# Patient Record
Sex: MALE | Race: BLACK OR AFRICAN AMERICAN | NOT HISPANIC OR LATINO | Employment: UNEMPLOYED | URBAN - NONMETROPOLITAN AREA
[De-identification: names, ages, dates, MRNs, and addresses within clinical notes are randomized per-mention and may not be internally consistent; named-entity substitution may affect disease eponyms.]

---

## 2017-07-17 ENCOUNTER — OFFICE VISIT (OUTPATIENT)
Dept: FAMILY MEDICINE CLINIC | Facility: CLINIC | Age: 37
End: 2017-07-17

## 2017-07-17 ENCOUNTER — APPOINTMENT (OUTPATIENT)
Dept: LAB | Facility: HOSPITAL | Age: 37
End: 2017-07-17

## 2017-07-17 VITALS
DIASTOLIC BLOOD PRESSURE: 90 MMHG | BODY MASS INDEX: 17.93 KG/M2 | HEIGHT: 72 IN | OXYGEN SATURATION: 98 % | WEIGHT: 132.4 LBS | SYSTOLIC BLOOD PRESSURE: 160 MMHG | HEART RATE: 81 BPM

## 2017-07-17 DIAGNOSIS — F41.1 GENERALIZED ANXIETY DISORDER: ICD-10-CM

## 2017-07-17 DIAGNOSIS — R94.31 ABNORMAL EKG: ICD-10-CM

## 2017-07-17 DIAGNOSIS — E78.5 HYPERLIPIDEMIA, UNSPECIFIED HYPERLIPIDEMIA TYPE: ICD-10-CM

## 2017-07-17 DIAGNOSIS — I10 ESSENTIAL HYPERTENSION: Primary | ICD-10-CM

## 2017-07-17 DIAGNOSIS — N52.9 ERECTILE DYSFUNCTION, UNSPECIFIED ERECTILE DYSFUNCTION TYPE: ICD-10-CM

## 2017-07-17 DIAGNOSIS — Z51.81 MEDICATION MONITORING ENCOUNTER: ICD-10-CM

## 2017-07-17 LAB
ALBUMIN SERPL-MCNC: 5.1 G/DL (ref 3.4–4.8)
ALBUMIN/GLOB SERPL: 1.2 G/DL (ref 1.1–1.8)
ALP SERPL-CCNC: 102 U/L (ref 38–126)
ALT SERPL W P-5'-P-CCNC: 28 U/L (ref 21–72)
ANION GAP SERPL CALCULATED.3IONS-SCNC: 12 MMOL/L (ref 5–15)
ARTICHOKE IGE QN: 112 MG/DL (ref 1–129)
AST SERPL-CCNC: 23 U/L (ref 17–59)
BASOPHILS # BLD AUTO: 0.02 10*3/MM3 (ref 0–0.2)
BASOPHILS NFR BLD AUTO: 0.3 % (ref 0–2)
BILIRUB SERPL-MCNC: 0.7 MG/DL (ref 0.2–1.3)
BUN BLD-MCNC: 10 MG/DL (ref 7–21)
BUN/CREAT SERPL: 10.3 (ref 7–25)
CALCIUM SPEC-SCNC: 9.7 MG/DL (ref 8.4–10.2)
CHLORIDE SERPL-SCNC: 101 MMOL/L (ref 95–110)
CHOLEST SERPL-MCNC: 223 MG/DL (ref 0–199)
CO2 SERPL-SCNC: 27 MMOL/L (ref 22–31)
CREAT BLD-MCNC: 0.97 MG/DL (ref 0.7–1.3)
DEPRECATED RDW RBC AUTO: 45.7 FL (ref 35.1–43.9)
EOSINOPHIL # BLD AUTO: 0.18 10*3/MM3 (ref 0–0.7)
EOSINOPHIL NFR BLD AUTO: 2.4 % (ref 0–7)
ERYTHROCYTE [DISTWIDTH] IN BLOOD BY AUTOMATED COUNT: 13.8 % (ref 11.5–14.5)
GFR SERPL CREATININE-BSD FRML MDRD: 104 ML/MIN/1.73 (ref 70–162)
GFR SERPL CREATININE-BSD FRML MDRD: 86 ML/MIN/1.73 (ref 70–162)
GLOBULIN UR ELPH-MCNC: 4.1 GM/DL (ref 2.3–3.5)
GLUCOSE BLD-MCNC: 79 MG/DL (ref 60–100)
HBA1C MFR BLD: 5.46 % (ref 4–5.6)
HCT VFR BLD AUTO: 46.5 % (ref 39–49)
HDLC SERPL-MCNC: 66 MG/DL (ref 60–200)
HGB BLD-MCNC: 16 G/DL (ref 13.7–17.3)
IMM GRANULOCYTES # BLD: 0.08 10*3/MM3 (ref 0–0.02)
IMM GRANULOCYTES NFR BLD: 1.1 % (ref 0–0.5)
LDLC/HDLC SERPL: 2.08 {RATIO} (ref 0–3.55)
LYMPHOCYTES # BLD AUTO: 1.85 10*3/MM3 (ref 0.6–4.2)
LYMPHOCYTES NFR BLD AUTO: 24.4 % (ref 10–50)
MCH RBC QN AUTO: 31.3 PG (ref 26.5–34)
MCHC RBC AUTO-ENTMCNC: 34.4 G/DL (ref 31.5–36.3)
MCV RBC AUTO: 90.8 FL (ref 80–98)
MONOCYTES # BLD AUTO: 0.5 10*3/MM3 (ref 0–0.9)
MONOCYTES NFR BLD AUTO: 6.6 % (ref 0–12)
NEUTROPHILS # BLD AUTO: 4.96 10*3/MM3 (ref 2–8.6)
NEUTROPHILS NFR BLD AUTO: 65.2 % (ref 37–80)
PLATELET # BLD AUTO: 189 10*3/MM3 (ref 150–450)
PMV BLD AUTO: 10.2 FL (ref 8–12)
POTASSIUM BLD-SCNC: 3.9 MMOL/L (ref 3.5–5.1)
PROT SERPL-MCNC: 9.2 G/DL (ref 6.3–8.6)
RBC # BLD AUTO: 5.12 10*6/MM3 (ref 4.37–5.74)
SODIUM BLD-SCNC: 140 MMOL/L (ref 137–145)
T4 FREE SERPL-MCNC: 1.09 NG/DL (ref 0.78–2.19)
TRIGL SERPL-MCNC: 97 MG/DL (ref 20–199)
TSH SERPL DL<=0.05 MIU/L-ACNC: 1.03 MIU/ML (ref 0.46–4.68)
WBC NRBC COR # BLD: 7.59 10*3/MM3 (ref 3.2–9.8)

## 2017-07-17 PROCEDURE — 80061 LIPID PANEL: CPT | Performed by: FAMILY MEDICINE

## 2017-07-17 PROCEDURE — 83036 HEMOGLOBIN GLYCOSYLATED A1C: CPT | Performed by: FAMILY MEDICINE

## 2017-07-17 PROCEDURE — 84439 ASSAY OF FREE THYROXINE: CPT | Performed by: FAMILY MEDICINE

## 2017-07-17 PROCEDURE — 93005 ELECTROCARDIOGRAM TRACING: CPT | Performed by: FAMILY MEDICINE

## 2017-07-17 PROCEDURE — 85025 COMPLETE CBC W/AUTO DIFF WBC: CPT | Performed by: FAMILY MEDICINE

## 2017-07-17 PROCEDURE — 36415 COLL VENOUS BLD VENIPUNCTURE: CPT | Performed by: FAMILY MEDICINE

## 2017-07-17 PROCEDURE — 80053 COMPREHEN METABOLIC PANEL: CPT | Performed by: FAMILY MEDICINE

## 2017-07-17 PROCEDURE — 99204 OFFICE O/P NEW MOD 45 MIN: CPT | Performed by: FAMILY MEDICINE

## 2017-07-17 PROCEDURE — 84443 ASSAY THYROID STIM HORMONE: CPT | Performed by: FAMILY MEDICINE

## 2017-07-17 PROCEDURE — 93010 ELECTROCARDIOGRAM REPORT: CPT | Performed by: INTERNAL MEDICINE

## 2017-07-17 RX ORDER — TADALAFIL 10 MG/1
TABLET ORAL
Qty: 10 TABLET | Refills: 5 | Status: SHIPPED | OUTPATIENT
Start: 2017-07-17 | End: 2018-08-03

## 2017-07-17 RX ORDER — QUETIAPINE FUMARATE 400 MG/1
400 TABLET, FILM COATED ORAL DAILY
Qty: 30 TABLET | Refills: 0 | Status: SHIPPED | OUTPATIENT
Start: 2017-07-17 | End: 2018-12-27 | Stop reason: ALTCHOICE

## 2017-07-17 RX ORDER — AMLODIPINE BESYLATE 2.5 MG/1
2.5 TABLET ORAL DAILY
Qty: 30 TABLET | Refills: 11 | Status: SHIPPED | OUTPATIENT
Start: 2017-07-17 | End: 2018-08-03 | Stop reason: SDUPTHER

## 2017-07-17 RX ORDER — QUETIAPINE FUMARATE 400 MG/1
400 TABLET, FILM COATED ORAL DAILY
COMMUNITY
Start: 2017-06-16 | End: 2017-07-17 | Stop reason: SDUPTHER

## 2017-07-19 ENCOUNTER — TELEPHONE (OUTPATIENT)
Dept: FAMILY MEDICINE CLINIC | Facility: CLINIC | Age: 37
End: 2017-07-19

## 2017-07-19 NOTE — TELEPHONE ENCOUNTER
We can print the rx's again except for the cialis. We will need to send this in to whichever pharmacy he chooses.

## 2017-07-21 ENCOUNTER — TELEPHONE (OUTPATIENT)
Dept: FAMILY MEDICINE CLINIC | Facility: CLINIC | Age: 37
End: 2017-07-21

## 2017-12-29 ENCOUNTER — TRANSCRIBE ORDERS (OUTPATIENT)
Dept: PHYSICAL THERAPY | Facility: HOSPITAL | Age: 37
End: 2017-12-29

## 2017-12-29 DIAGNOSIS — S32.401B: Primary | ICD-10-CM

## 2018-01-02 ENCOUNTER — HOSPITAL ENCOUNTER (OUTPATIENT)
Dept: PHYSICAL THERAPY | Facility: HOSPITAL | Age: 38
Setting detail: THERAPIES SERIES
Discharge: HOME | End: 2018-01-02

## 2018-01-02 DIAGNOSIS — M25.551 PAIN OF RIGHT HIP JOINT: ICD-10-CM

## 2018-01-02 DIAGNOSIS — S32.401B: Primary | ICD-10-CM

## 2018-01-02 PROCEDURE — 97110 THERAPEUTIC EXERCISES: CPT | Performed by: PHYSICAL THERAPIST

## 2018-01-02 PROCEDURE — 97162 PT EVAL MOD COMPLEX 30 MIN: CPT | Performed by: PHYSICAL THERAPIST

## 2018-01-02 PROCEDURE — 97116 GAIT TRAINING THERAPY: CPT | Performed by: PHYSICAL THERAPIST
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
## 2018-01-04 ENCOUNTER — HOSPITAL ENCOUNTER (OUTPATIENT)
Dept: PHYSICAL THERAPY | Facility: HOSPITAL | Age: 38
Setting detail: THERAPIES SERIES
Discharge: HOME | End: 2018-01-04

## 2018-01-04 DIAGNOSIS — S32.401B: Primary | ICD-10-CM

## 2018-01-04 DIAGNOSIS — M25.551 PAIN OF RIGHT HIP JOINT: ICD-10-CM

## 2018-01-04 PROCEDURE — 97110 THERAPEUTIC EXERCISES: CPT

## 2018-01-04 NOTE — THERAPY TREATMENT NOTE
Outpatient Physical Therapy Ortho Treatment Note  [facility]     Patient Name: Bogdan Thompson  : 1980  MRN: [id number]  Today's Date: 2018      Visit Date: 2018  Pt reports 6/10 pain pre treatment, 4/10 pain post treatment  Reports0 % of improvement.  Attended 2/2 visits.  Insurance available: 20 visits  Next MD appt:2018.  Recertification: 2018.  Visit Dx:    ICD-10-CM ICD-9-CM   1. Open displaced fracture of right acetabulum, unspecified portion of acetabulum, initial encounter S32.401B 808.1   2. Pain of right hip joint M25.551 719.45       There is no problem list on file for this patient.       Past Medical History:   Diagnosis Date   • Anxiety    • Hypertension    • Insomnia         Past Surgical History:   Procedure Laterality Date   • HIP FRACTURE SURGERY Right 2017             PT Ortho       18 0900    Subjective Comments    Subjective Comments PTA went over TTWB and checked weight bearing status with pt by placing fingers under right foot to measure pressure. Pt needs cues on proper TTWB. PTA adjusted walker height for patient this date.  -TL    Precautions and Contraindications    Precautions/Limitations non-weight bearing status  -TL    Precautions TTWB  -TL    Subjective Pain    Able to rate subjective pain? yes  -TL    Pre-Treatment Pain Level 6  -TL    Post-Treatment Pain Level 4  -TL      18 0900    Subjective Comments    Subjective Comments Patient wishes to get back to where he was before and not have the foot pain  -AJ    Precautions and Contraindications    Precautions/Limitations non-weight bearing status  -AJ    Precautions TTWB  -AJ    Subjective Pain    Able to rate subjective pain? yes  -AJ    Pre-Treatment Pain Level 0  -AJ    Post-Treatment Pain Level 0  -AJ    Posture/Observations    Posture- WNL Posture is WNL  -AJ    Observations Muscle atrophy   R hip/Quad  -AJ    Posture/Observations Comments No acute distress, has cane with him  and wife accompanies him.  -AJ    Special Tests/Palpation    Special Tests/Palpation --   Hypersensitivity in R dorsum of foot  -AJ    General LE Assessment    ROM Detail B LE and hips all WNL, except R specifically listed below (L lWNL, listed for comparision)  -AJ    Left Hip    Flexion AROM --   135°  -AJ    ABduction AROM --   47°  -AJ    Internal Rotation AROM --   75°  -AJ    External Rotation AROM --   55°  -AJ    Right Hip    Flexion AROM --   110°  -AJ    ABduction AROM --   30°  -AJ    Internal Rotation AROM --   8°  -AJ    External Rotation AROM --   22°  -AJ    MMT (Manual Muscle Testing)    General MMT Assessment Detail L LE 5/5, except HS 4+/5; R LE listed below if not 5/5  -AJ    Right Hip    Hip Flexion Gross Movement (4-/5) good minus  -AJ    Hip Extension Gross Movement (4-/5) good minus  -AJ    Hip ABduction Gross Movement (4-/5) good minus  -AJ    Hip ADduction Gross Movement (4/5) good  -AJ    Right Knee    Knee Extension Gross Movement (4-/5) good minus  -AJ    Knee Flexion Gross Movement (4/5) good  -AJ    Transfers    Transfer, Comment I with all transfers.  -    Gait Assessment/Treatment    Gait, [place] Level conditional independence  -    Gait, Assistive Device rolling walker;straight cane  -    Gait, Gait Pattern Analysis 2-point gait  -    Gait, Maintain Weight Bearing Status unable to maintain weight bearing status;cues to maintain weight bearing status  -    Gait, Safety Issues balance decreased during turns  -AJ    Gait, Impairments unable to maintain weight bearing status  -    Gait, Comment Patient came in with SC, WBAT, I got a walker and immediately instructed in TTWB and instructed it to patient as well as precautions.  -AJ    Stairs Assessment/Treatment    Number of Stairs Not tested.  -      User Key  (r) = Recorded By, (t) = Taken By, (c) = Cosigned By    Initials Name Provider Type    SHIRLEY Barbour, PT Physical Therapist    MYNOR Garner, PTA  "Physical Therapy Assistant                            PT Assessment/Plan       01/04/18 0900       PT Assessment    Assessment Comments pt still having right foot pain vs hip pain. pt states it a sharp pain that hits in his foot. No goals met at this time but progressing. Pt has been educated on TTWB. PTA adjusted walker. PTA checked pt with gait weight bearing by placing hand under foot. Pt needs cues only place toes down for balance without weight   Pt doing towel rub at home. No reddness or swelling noted.  -TL     PT Plan    PT Frequency 2x/week  -TL     PT Plan Comments REview HEP  -TL       User Key  (r) = Recorded By, (t) = Taken By, (c) = Cosigned By    Initials Name Provider Type    MYNOR Garner PTA Physical Therapy Assistant                Modalities       01/04/18 0900          Ice    Ice Applied Yes  -TL      Location right hip  -TL      Rx Minutes 15 mins  -TL      Ice S/P Rx Yes  -TL        User Key  (r) = Recorded By, (t) = Taken By, (c) = Cosigned By    Initials Name Provider Type    MYNOR Garner PTA Physical Therapy Assistant                Exercises       01/04/18 0900          Subjective Comments    Subjective Comments PTA went over TTWB and checked weight bearing status with pt by placing fingers under right foot to measure pressure. Pt needs cues on proper TTWB. PTA adjusted walker height for patient this date.  -TL      Subjective Pain    Able to rate subjective pain? yes  -TL      Pre-Treatment Pain Level 6  -TL      Post-Treatment Pain Level 4  -TL      Exercise 1    Exercise Name 1 Insturction in TTWB with RW  -TL      Time (Minutes) 1 10-12 minutes  -TL      Exercise 2    Exercise Name 2 Seated LAQ  -TL      Cueing 2 Demo  -TL      Time (Seconds) 2 5 sec hold  -TL      Exercise 3    Exercise Name 3 Seated marching  -TL      Reps 3 20  -TL      Exercise 4    Exercise Name 4 Supine heelslides  -TL      Reps 4 20  -TL      Time (Seconds) 4 5\" hold  -TL      Exercise 5    Exercise " Name 5 Supine quad sets  -TL      Reps 5 20  -TL      Time (Seconds) 5 5 sec hold  -TL      Exercise 6    Exercise Name 6 SAQ  -TL      Sets 6 2  -TL      Reps 6 10  -TL      Time (Seconds) 6 5 sec hold  -TL      Exercise 7    Exercise Name 7 SLR  -TL      Sets 7 2  -TL      Reps 7 10  -TL      Time (Seconds) 7 3-5 sec hold  -TL      Exercise 8    Exercise Name 8 sidelying clams  -TL      Sets 8 1  -TL      Reps 8 15  -TL      Exercise 9    Exercise Name 9 sidelying SLR  -TL      Sets 9 2  -TL      Reps 9 10  -TL      Exercise 10    Exercise Name 10 Towel Rub  -TL      Time (Minutes) 10 5 mins  -TL        User Key  (r) = Recorded By, (t) = Taken By, (c) = Cosigned By    Initials Name Provider Type    MYNOR Garner, PTA Physical Therapy Assistant                               PT OP Goals       01/04/18 0900       PT Short Term Goals    STG Date to Achieve 01/23/18  -TL     STG 1 I with HEP and have additions/changes by next recertification.  -TL     STG 1 Progress Ongoing;Progressing  -TL     STG 2 Patient to adhear to TTWB status until cleared by MD for more WB.  -TL     STG 2 Progress Ongoing  -TL     STG 3 R hip 4/5 or greater in all directions.  -TL     STG 3 Progress Ongoing  -TL     STG 4 R QS >=4/5  -TL     STG 4 Progress Ongoing  -TL     STG 5 AROm R hip flexion >= 120°.  -TL     STG 5 Progress Ongoing  -TL     Long Term Goals    LTG Date to Achieve 03/09/18   once cleared by MD  -TL     LTG 1 AROM R hip all WNL, no increase in pain.  -TL     LTG 2 B LE 5/5 in all directions  -TL     LTG 3 Able to ambulate 1/2 mile non-antalgically no assistive device.  -TL     LTG 4 Patient able to ambulate up/down 3 steps reciprocally x10 no HRA.  -TL     LTG 5 Patient able to perform a full squat and return to standing x10.  -TL     LTG 6 Patient able to perform push/pull sled 100# F/R [id number].  -TL     LTG 7 Patient able to perform proper lifting technique floor to waist to shoulder level with 25# x10.  -TL     LTG  8 I with final HEP.  -TL     LTG 9 D/C with ifnal HEP and free 30 day fitness formula membership.  -TL     Time Calculation    PT Goal Re-Cert Due Date 01/23/18  -TL       User Key  (r) = Recorded By, (t) = Taken By, (c) = Cosigned By    Initials Name Provider Type    TL Alexus Garner PTA Physical Therapy Assistant          Therapy Education  Education Details: Quad sets, SAQ, LAQ, sidelying clams  Given: HEP, Pain management  Program: New  How Provided: Verbal, Demonstration, Written  Provided to: Patient  Level of Understanding: Verbalized, Demonstrated              Time Calculation:   Start Time: 0851  Stop Time: 0950  Time Calculation (min): 59 min  PT Non-Billable Time (min): 15 min  Total Timed Code Minutes- PT: 44 minute(s)    Therapy Charges for Today     Code Description Service Date Service Provider Modifiers Qty    20053139142 HC PT THER PROC EA 15 MIN 1/4/2018 Alexus Garner PTA GP 3    33176884525 HC PT THER SUPP EA 15 MIN 1/4/2018 Alexus Garner PTA GP 1                    Alexus Garner PTA  1/4/2018

## 2018-01-09 ENCOUNTER — TELEPHONE (OUTPATIENT)
Dept: PHYSICAL THERAPY | Facility: HOSPITAL | Age: 38
End: 2018-01-09

## 2018-01-11 ENCOUNTER — TELEPHONE (OUTPATIENT)
Dept: PHYSICAL THERAPY | Facility: HOSPITAL | Age: 38
End: 2018-01-11

## 2018-03-21 ENCOUNTER — DOCUMENTATION (OUTPATIENT)
Dept: PHYSICAL THERAPY | Facility: HOSPITAL | Age: 38
End: 2018-03-21

## 2018-03-21 DIAGNOSIS — M25.551 PAIN OF RIGHT HIP JOINT: ICD-10-CM

## 2018-03-21 DIAGNOSIS — S32.401B: Primary | ICD-10-CM

## 2018-08-03 ENCOUNTER — OFFICE VISIT (OUTPATIENT)
Dept: FAMILY MEDICINE CLINIC | Facility: CLINIC | Age: 38
End: 2018-08-03

## 2018-08-03 VITALS
HEART RATE: 89 BPM | WEIGHT: 123.06 LBS | BODY MASS INDEX: 16.67 KG/M2 | OXYGEN SATURATION: 98 % | HEIGHT: 72 IN | SYSTOLIC BLOOD PRESSURE: 146 MMHG | DIASTOLIC BLOOD PRESSURE: 98 MMHG

## 2018-08-03 DIAGNOSIS — F41.9 ANXIETY: Primary | ICD-10-CM

## 2018-08-03 DIAGNOSIS — N52.9 ERECTILE DYSFUNCTION, UNSPECIFIED ERECTILE DYSFUNCTION TYPE: ICD-10-CM

## 2018-08-03 DIAGNOSIS — I10 ESSENTIAL HYPERTENSION: ICD-10-CM

## 2018-08-03 PROCEDURE — 99214 OFFICE O/P EST MOD 30 MIN: CPT | Performed by: FAMILY MEDICINE

## 2018-08-03 RX ORDER — TADALAFIL 10 MG/1
TABLET ORAL
Qty: 10 TABLET | Refills: 5 | Status: CANCELLED | OUTPATIENT
Start: 2018-08-03

## 2018-08-03 RX ORDER — AMLODIPINE BESYLATE 2.5 MG/1
2.5 TABLET ORAL DAILY
Qty: 30 TABLET | Refills: 11 | Status: SHIPPED | OUTPATIENT
Start: 2018-08-03 | End: 2018-12-27 | Stop reason: SDUPTHER

## 2018-08-03 RX ORDER — SILDENAFIL 50 MG/1
50 TABLET, FILM COATED ORAL DAILY PRN
Qty: 10 TABLET | Refills: 2 | Status: SHIPPED | OUTPATIENT
Start: 2018-08-03 | End: 2018-10-24

## 2018-08-03 RX ORDER — AMLODIPINE BESYLATE 2.5 MG/1
2.5 TABLET ORAL DAILY
Qty: 30 TABLET | Refills: 11 | Status: SHIPPED | OUTPATIENT
Start: 2018-08-03 | End: 2018-08-03 | Stop reason: SDUPTHER

## 2018-08-03 RX ORDER — TRAZODONE HYDROCHLORIDE 100 MG/1
1 TABLET ORAL NIGHTLY
Refills: 2 | COMMUNITY
Start: 2018-07-20 | End: 2018-12-27 | Stop reason: SDUPTHER

## 2018-08-03 RX ORDER — HALOPERIDOL 5 MG/1
1 TABLET ORAL 2 TIMES DAILY
Refills: 1 | COMMUNITY
Start: 2018-07-17 | End: 2018-12-27 | Stop reason: SDUPTHER

## 2018-08-03 NOTE — PROGRESS NOTES
Subjective   Bogdan Thompson is a 38 y.o. male.     History of Present Illness The patient comes in for check of their chronic medical issues which include hip pain after hip surgery for a fracture. He was doing P. T. And needs a referral for this. .       The following portions of the patient's history were reviewed and updated as appropriate: allergies, current medications, past family history, past medical history, past social history, past surgical history and problem list.       Review of Systems   Constitutional: Negative for fatigue and fever.   Respiratory: Negative for cough, chest tightness and stridor.    Cardiovascular: Negative for chest pain, palpitations and leg swelling.   Musculoskeletal: Positive for arthralgias and joint swelling. Negative for back pain and gait problem.       Objective   Physical Exam   Constitutional: He appears well-developed and well-nourished.   HENT:   Head: Normocephalic and atraumatic.   Right Ear: External ear normal.   Left Ear: External ear normal.   Nose: Nose normal.   Mouth/Throat: Oropharynx is clear and moist.   Eyes: Pupils are equal, round, and reactive to light.   Neck: Normal range of motion.   Cardiovascular: Normal rate, regular rhythm and normal heart sounds.  Exam reveals no gallop and no friction rub.    No murmur heard.  Pulmonary/Chest: Effort normal and breath sounds normal. No respiratory distress. He has no wheezes. He has no rales.   Abdominal: Soft. Bowel sounds are normal. He exhibits no distension. There is no tenderness. There is no guarding.   Musculoskeletal:   His right hip has a well healed scar. He has good range of motion. Knees are non tender.   Skin: Skin is warm and dry.   Vitals reviewed.        Assessment/Plan   Bogdan was seen today for establish care and hypertension.    Diagnoses and all orders for this visit:    Anxiety    Essential hypertension  -     amLODIPine (NORVASC) 2.5 MG tablet; Take 1 tablet by mouth Daily.  -     Comprehensive  metabolic panel; Future  -     LDL cholesterol, direct; Future  -     CBC w AUTO Differential; Future    Erectile dysfunction, unspecified erectile dysfunction type    Other orders  -     Cancel: tadalafil (CIALIS) 10 MG tablet; Take 1 tab po daily prn for erectile dysfunction. Take 30 minutes prior to intercourse  -     sildenafil (VIAGRA) 50 MG tablet; Take 1 tablet by mouth Daily As Needed for erectile dysfunction.      Return to the clinic in 3 months.  Will contact with results as needed.

## 2018-08-14 ENCOUNTER — TELEPHONE (OUTPATIENT)
Dept: FAMILY MEDICINE CLINIC | Facility: CLINIC | Age: 38
End: 2018-08-14

## 2018-08-14 DIAGNOSIS — M79.673 PAIN OF FOOT, UNSPECIFIED LATERALITY: ICD-10-CM

## 2018-08-14 DIAGNOSIS — M25.551 PAIN OF RIGHT HIP JOINT: Primary | ICD-10-CM

## 2018-08-15 ENCOUNTER — HOSPITAL ENCOUNTER (OUTPATIENT)
Dept: PHYSICAL THERAPY | Facility: HOSPITAL | Age: 38
Setting detail: THERAPIES SERIES
Discharge: HOME | End: 2018-08-15
Attending: FAMILY MEDICINE

## 2018-08-15 DIAGNOSIS — M25.551 PAIN OF RIGHT HIP JOINT: ICD-10-CM

## 2018-08-15 DIAGNOSIS — M25.571 RIGHT ANKLE PAIN, UNSPECIFIED CHRONICITY: ICD-10-CM

## 2018-08-15 DIAGNOSIS — S32.401B: Primary | ICD-10-CM

## 2018-08-15 PROCEDURE — 97110 THERAPEUTIC EXERCISES: CPT | Performed by: PHYSICAL THERAPIST

## 2018-08-15 PROCEDURE — 97162 PT EVAL MOD COMPLEX 30 MIN: CPT | Performed by: PHYSICAL THERAPIST
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
## 2018-08-20 ENCOUNTER — HOSPITAL ENCOUNTER (OUTPATIENT)
Dept: PHYSICAL THERAPY | Facility: HOSPITAL | Age: 38
Setting detail: THERAPIES SERIES
Discharge: HOME | End: 2018-08-20
Attending: FAMILY MEDICINE

## 2018-08-20 DIAGNOSIS — M25.571 RIGHT ANKLE PAIN, UNSPECIFIED CHRONICITY: ICD-10-CM

## 2018-08-20 DIAGNOSIS — M25.551 PAIN OF RIGHT HIP JOINT: ICD-10-CM

## 2018-08-20 DIAGNOSIS — S32.401B: Primary | ICD-10-CM

## 2018-08-20 PROCEDURE — 97110 THERAPEUTIC EXERCISES: CPT

## 2018-08-20 NOTE — THERAPY TREATMENT NOTE
Outpatient Physical Therapy Ortho Treatment Note  [facility]     Patient Name: Bogdan Thompson  : 1980  MRN: [id number]  Today's Date: 2018      Visit Date: 2018  Pt reports 6/10 pain pre treatment, 1/10 pain post treatment  Reports 0% of improvement.  Attended 2/2 visits.  Insurance available: 20 visits  Next MD appt:[id number] .  Recertification: 2018.  Visit Dx:    ICD-10-CM ICD-9-CM   1. Open displaced fracture of right acetabulum, unspecified portion of acetabulum, initial encounter (CMS/[facility]) S32.401B 808.1   2. Pain of right hip joint M25.551 719.45   3. Right ankle pain, unspecified chronicity M25.571 719.47       There is no problem list on file for this patient.       Past Medical History:   Diagnosis Date   • Anxiety    • Hypertension    • Insomnia         Past Surgical History:   Procedure Laterality Date   • HIP FRACTURE SURGERY Right 2017             PT Ortho     Row Name 18 1500       Subjective Comments    Subjective Comments No new c/o's this date. pt reports that he has been doing his HEP.  -TL       Precautions and Contraindications    Precautions WBAT  -TL      User Key  (r) = Recorded By, (t) = Taken By, (c) = Cosigned By    Initials Name Provider Type    Alexus Galvan PTA Physical Therapy Assistant                            PT Assessment/Plan     Row Name 18 1500          PT Assessment    Assessment Comments No new goals met. Pt tolerated 4-way SLR and 4-way GTB ankle ex this date. PTA reviewed HEP at home.  -TL        PT Plan    PT Frequency 2x/week  -TL     PT Plan Comments start SLS in // next visit  -TL       User Key  (r) = Recorded By, (t) = Taken By, (c) = Cosigned By    Initials Name Provider Type    Alexus Galvan PTA Physical Therapy Assistant                Modalities     Row Name 18 1500             Ice    Ice Applied Yes  -TL      Location right hip/right ankle  -TL      Rx Minutes 10 mins  -TL      Ice S/P  Rx Yes  -TL        User Key  (r) = Recorded By, (t) = Taken By, (c) = Cosigned By    Initials Name Provider Type    Alexus Galvan PTA Physical Therapy Assistant                Exercises     Row Name 08/20/18 1500             Subjective Comments    Subjective Comments No new c/o's this date. pt reports that he has been doing his HEP.  -TL         Subjective Pain    Able to rate subjective pain? yes  -TL      Pre-Treatment Pain Level 6  -TL      Post-Treatment Pain Level 1  -TL         Exercise 1    Exercise Name 1 Pro II LE  -TL      Time 1 10 min  -TL      Additional Comments level 4  -TL         Exercise 2    Exercise Name 2 B St. Gastroc S  -TL      Reps 2 2  -TL      Time 2 30 sec hold  -TL      Additional Comments gastroc / Soleus  -TL         Exercise 3    Exercise Name 3 B seated piriformis S  -TL      Reps 3 2  -TL      Time 3 30 sec hold  -TL         Exercise 4    Sets 4 15  -TL      Time (Seconds) 4 st heelraises on step with ECC  -TL         Exercise 5    Reps 5 3 mins  -TL      Time (Seconds) 5 can roll  -TL         Exercise 6    Reps 6 20  -TL      Additional Comments GTB  -TL      Time (Seconds) 6 ankle 4-way  -TL         Exercise 7    Sets 7 20  -TL      Time (Seconds) 7 4-way SLR  -TL        User Key  (r) = Recorded By, (t) = Taken By, (c) = Cosigned By    Initials Name Provider Type    Alexus Galvan PTA Physical Therapy Assistant                               PT OP Goals     Row Name 08/20/18 1520          PT Short Term Goals    STG Date to Achieve 09/05/18  -TL     STG 1 I with HEP and have additions/changes by next recertification.  -TL     STG 1 Progress Ongoing;Progressing  -TL     STG 2 R hip flexion 5/5.  -TL     STG 2 Progress Ongoing  -TL     STG 3 Improved R hip PROm abd >= 40°.  -TL     STG 3 Progress Ongoing;Progressing  -TL     STG 4 Patient to be compliant with heel wedge wear.  -TL     STG 4 Progress Ongoing;Progressing  -TL     STG 5 Patient to report the bottom of his  foot is feeling better with no TTP in plantar fiscia.  -TL     STG 5 Progress Ongoing  -TL        Long Term Goals    LTG Date to Achieve 09/21/18  -TL     LTG 1 AROM for R hip all WFL, no increase in pain.  -TL     LTG 2 B LE 5/5, no increase in pain.  -TL     LTG 3 patient able to ambulate for 10 minutes with no increase in pain.  -TL     LTG 4 Patient able to ambulate up/down 3 steps reciprocally x10 reps with no increase in pain.  -TL     LTG 5 I with final HEP.  -TL     LTG 6 D/C with a final HEP and free 30 day fitness formula membership.  -TL        Time Calculation    PT Goal Re-Cert Due Date 09/05/18  -TL       User Key  (r) = Recorded By, (t) = Taken By, (c) = Cosigned By    Initials Name Provider Type    Alexus Galvan PTA Physical Therapy Assistant          Therapy Education  Education Details: 4-way ankle GTB, SLR-4 way (gastroc/soleus s)  Given: HEP, Symptoms/condition management, Pain management  Program: New  How Provided: Verbal, Demonstration, Written  Provided to: Patient  Level of Understanding: Verbalized, Demonstrated              Time Calculation:   Start Time: 1520  Stop Time: 1615  Time Calculation (min): 55 min  PT Non-Billable Time (min): 10 min  Total Timed Code Minutes- PT: 45 minute(s)  Therapy Suggested Charges     Code   Minutes Charges    None           Therapy Charges for Today     Code Description Service Date Service Provider Modifiers Qty    92149168642 HC PT THER PROC EA 15 MIN 8/20/2018 Alexus Garner PTA GP 3    38967672504 HC PT THER SUPP EA 15 MIN 8/20/2018 Alexus Garner PTA GP 1                    Alexus Garner PTA  8/20/2018

## 2018-08-22 ENCOUNTER — HOSPITAL ENCOUNTER (OUTPATIENT)
Dept: PHYSICAL THERAPY | Facility: HOSPITAL | Age: 38
Setting detail: THERAPIES SERIES
Discharge: HOME | End: 2018-08-22
Attending: FAMILY MEDICINE

## 2018-08-22 DIAGNOSIS — S32.401B: Primary | ICD-10-CM

## 2018-08-22 DIAGNOSIS — M25.571 RIGHT ANKLE PAIN, UNSPECIFIED CHRONICITY: ICD-10-CM

## 2018-08-22 DIAGNOSIS — M25.551 PAIN OF RIGHT HIP JOINT: ICD-10-CM

## 2018-08-22 PROCEDURE — 97110 THERAPEUTIC EXERCISES: CPT

## 2018-08-22 NOTE — THERAPY TREATMENT NOTE
Outpatient Physical Therapy Ortho Treatment Note  [facility]  Bere Barrett PTA       Patient Name: Bogdan Thompson  : 1980  MRN: [id number]  Today's Date: 2018      Visit Date: 2018     Visits: 3/3  Insurance Visits Approved: 20 visits  Recert Due: 2018  MD Appt: TBD  Pain: pretreatment 7/10; post treatment 0/10  Improvement: pt is subjectively reporting 0% improvement since initial evaluation    Visit Dx:    ICD-10-CM ICD-9-CM   1. Open displaced fracture of right acetabulum, unspecified portion of acetabulum, initial encounter (CMS/[facility]) S32.401B 808.1   2. Pain of right hip joint M25.551 719.45   3. Right ankle pain, unspecified chronicity M25.571 719.47       There is no problem list on file for this patient.       Past Medical History:   Diagnosis Date   • Anxiety    • Hypertension    • Insomnia         Past Surgical History:   Procedure Laterality Date   • HIP FRACTURE SURGERY Right 2017             PT Ortho     Row Name 18 1500       Subjective Comments    Subjective Comments states that he is having a lot of pain in his right hip and ankle   -       Precautions and Contraindications    Precautions WBAT  -       Subjective Pain    Able to rate subjective pain? yes  -    Pre-Treatment Pain Level 7  -    Post-Treatment Pain Level 0  -    Row Name 18 1500       Subjective Comments    Subjective Comments No new c/o's this date. pt reports that he has been doing his HEP.  -TL       Precautions and Contraindications    Precautions WBAT  -TL      User Key  (r) = Recorded By, (t) = Taken By, (c) = Cosigned By    Initials Name Provider Type     Bere Barrett PTA Physical Therapy Assistant     Alexus Garner PTA Physical Therapy Assistant                            PT Assessment/Plan     Row Name 18 1500          PT Assessment    Assessment Comments pt receptive to cues and instruction today. good effort throughout.   -        PT  Plan    PT Frequency 2x/week  -     PT Plan Comments SLS next visit, step up next visit  -       User Key  (r) = Recorded By, (t) = Taken By, (c) = Cosigned By    Initials Name Provider Type     Bere Barrett PTA Physical Therapy Assistant                Modalities     Row Name 08/22/18 1500             Ice    Ice Applied Yes  -MH      Location right hip/right ankle  -      Rx Minutes Other:   20  -MH      Ice S/P Rx Yes  -        User Key  (r) = Recorded By, (t) = Taken By, (c) = Cosigned By    Initials Name Provider Type     NenaBere PTA Physical Therapy Assistant                Exercises     Row Name 08/22/18 1500             Subjective Comments    Subjective Comments states that he is having a lot of pain in his right hip and ankle   -         Subjective Pain    Able to rate subjective pain? yes  -MH      Pre-Treatment Pain Level 7  -MH      Post-Treatment Pain Level 0  -MH         Exercise 1    Exercise Name 1 Pro II LE's  -MH      Time 1 10 minutes  -      Additional Comments L 5.0  -MH         Exercise 2    Exercise Name 2 B St. HS S  -MH      Reps 2 2  -MH      Time 2 30 sec hold  -MH         Exercise 3    Exercise Name 3 B Seated Piriformis S  -MH      Reps 3 2  -MH      Time 3 30 sec hold  -MH         Exercise 4    Exercise Name 4 B St. Gastroc/Soleus S  -MH      Reps 4 2  -MH      Time 4 30 sec hold  -MH         Exercise 5    Sets 5 20  -MH      Additional Comments no UE; good ecc control  -MH      Time (Seconds) 5 Sit to/from Stand   -         Exercise 6    Reps 6 10  -MH      Time 6 10 sec hold  -MH      Time (Seconds) 6 LTR  -MH         Exercise 7    Reps 7 2  -MH      Time 7 30 sec hold  -MH      Time (Seconds) 7 B Supine Hip Flex S  -MH         Exercise 8    Exercise Name 8 Bridges  -MH      Reps 8 20  -MH      Time 8 5 sec hold  -MH         Exercise 9    Exercise Name 9 Hooklying Hip ABD with transabs  -MH      Reps 9 20  -MH      Time 9 5 sec  -MH      Additional Comments  green tband  -         Exercise 10    Exercise Name 10 Ecc Heel Raises  -      Reps 10 20  -        User Key  (r) = Recorded By, (t) = Taken By, (c) = Cosigned By    Initials Name Provider Type    Bere Rosario PTA Physical Therapy Assistant                               PT OP Goals     Row Name 08/22/18 1500          PT Short Term Goals    STG Date to Achieve 09/05/18  -     STG 1 I with HEP and have additions/changes by next recertification.  -     STG 1 Progress Partially Met;Ongoing  -     STG 2 R hip flexion 5/5.  -     STG 2 Progress Ongoing  -     STG 3 Improved R hip PROm abd >= 40°.  -     STG 3 Progress Ongoing;Progressing  -     STG 4 Patient to be compliant with heel wedge wear.  -     STG 4 Progress Ongoing;Progressing  -     STG 5 Patient to report the bottom of his foot is feeling better with no TTP in plantar fiscia.  -     STG 5 Progress Ongoing  -        Long Term Goals    LTG Date to Achieve 09/21/18  -     LTG 1 AROM for R hip all WFL, no increase in pain.  -     LTG 2 B LE 5/5, no increase in pain.  -     LTG 3 patient able to ambulate for 10 minutes with no increase in pain.  -     LTG 4 Patient able to ambulate up/down 3 steps reciprocally x10 reps with no increase in pain.  -     LTG 5 I with final HEP.  -     LTG 6 D/C with a final HEP and free 30 day fitness formula membership.  -        Time Calculation    PT Goal Re-Cert Due Date 09/05/18  -       User Key  (r) = Recorded By, (t) = Taken By, (c) = Cosigned By    Initials Name Provider Type    Bere Rosario PTA Physical Therapy Assistant          Therapy Education  Education Details: all therex performed this treatment added to HEP  Given: HEP, Symptoms/condition management, Pain management  Program: New  How Provided: Verbal, Demonstration, Written  Provided to: Patient  Level of Understanding: Verbalized, Demonstrated              Time Calculation:   Start Time: 1515  Stop Time:  1625  Time Calculation (min): 70 min  Total Timed Code Minutes- PT: 50 minute(s)    Therapy Charges for Today     Code Description Service Date Service Provider Modifiers Qty    73080040910 HC PT THER PROC EA 15 MIN 8/22/2018 Bere Barrett PTA GP 3    58264859745 HC PT THER SUPP EA 15 MIN 8/22/2018 Bere Barrett PTA GP 1                    Bere Barrett PTA  8/22/2018

## 2018-08-27 ENCOUNTER — TELEPHONE (OUTPATIENT)
Dept: PHYSICAL THERAPY | Facility: HOSPITAL | Age: 38
End: 2018-08-27

## 2018-08-28 ENCOUNTER — HOSPITAL ENCOUNTER (OUTPATIENT)
Dept: PHYSICAL THERAPY | Facility: HOSPITAL | Age: 38
Setting detail: THERAPIES SERIES
Discharge: HOME | End: 2018-08-28

## 2018-08-28 DIAGNOSIS — S32.401B: Primary | ICD-10-CM

## 2018-08-28 DIAGNOSIS — M25.551 PAIN OF RIGHT HIP JOINT: ICD-10-CM

## 2018-08-28 DIAGNOSIS — M25.571 RIGHT ANKLE PAIN, UNSPECIFIED CHRONICITY: ICD-10-CM

## 2018-08-28 PROCEDURE — 97110 THERAPEUTIC EXERCISES: CPT

## 2018-08-28 NOTE — THERAPY TREATMENT NOTE
Outpatient Physical Therapy Ortho Treatment Note  [facility]  Bere Barrett PTA       Patient Name: Bogdan Thompson  : 1980  MRN: [id number]  Today's Date: 2018      Visit Date: 2018     Visits: 4/5  Insurance Visits Approved: 20 visits  Recert Due: 2018  MD Appt: TBD  Pain: pretreatment 8/10; post treatment 5/10  Improvement: pt is subjectively reporting 0% improvement since initial evaluation    Visit Dx:    ICD-10-CM ICD-9-CM   1. Open displaced fracture of right acetabulum, unspecified portion of acetabulum, initial encounter (CMS/[facility]) S32.401B 808.1   2. Pain of right hip joint M25.551 719.45   3. Right ankle pain, unspecified chronicity M25.571 719.47       There is no problem list on file for this patient.       Past Medical History:   Diagnosis Date   • Anxiety    • Hypertension    • Insomnia         Past Surgical History:   Procedure Laterality Date   • HIP FRACTURE SURGERY Right 2017             PT Ortho     Row Name 18 1500       Subjective Comments    Subjective Comments states that he always has pain. states that doctors appointment is getting rescheduled. doesn't have a date yet. states that they cancelled his appointment.   -       Precautions and Contraindications    Precautions WBAT  -       Subjective Pain    Able to rate subjective pain? yes  -    Pre-Treatment Pain Level 8  -    Post-Treatment Pain Level 5  -      User Key  (r) = Recorded By, (t) = Taken By, (c) = Cosigned By    Initials Name Provider Type     Bere Barrett PTA Physical Therapy Assistant                            PT Assessment/Plan     Row Name 18 1500          PT Assessment    Assessment Comments has decreased pain with treatment today. tolerates increased intensity  -        PT Plan    PT Frequency 2x/week  -     PT Plan Comments next visit mini squats,   -       User Key  (r) = Recorded By, (t) = Taken By, (c) = Cosigned By    Initials Name  Provider Type     Davidyolanda Bere ECHEVARRIA PTA Physical Therapy Assistant                Modalities     Row Name 08/28/18 1500             Ice    Ice Applied Yes  -MH      Location right hip/right ankle  -MH      Rx Minutes 15 mins  -MH      Ice S/P Rx Yes  -        User Key  (r) = Recorded By, (t) = Taken By, (c) = Cosigned By    Initials Name Provider Type     Bere BarrettSUE Physical Therapy Assistant                Exercises     Row Name 08/28/18 1500             Subjective Comments    Subjective Comments states that he always has pain. states that doctors appointment is getting rescheduled. doesn't have a date yet. states that they cancelled his appointment.   -MH         Subjective Pain    Able to rate subjective pain? yes  -      Pre-Treatment Pain Level 8  -MH      Post-Treatment Pain Level 5  -MH         Exercise 1    Exercise Name 1 Pro II LE's  -MH      Time 1 10 minutes  -MH      Additional Comments L 6.0  -MH         Exercise 2    Exercise Name 2 B St. HS S  -MH      Reps 2 2  -MH      Time 2 30 sec hold  -MH         Exercise 3    Exercise Name 3 B Seated Piriformis S  -MH      Cueing 3 Verbal  -MH      Reps 3 2  -MH      Time 3 30 sec hold  -MH      Additional Comments postural cues  -MH         Exercise 4    Exercise Name 4 Sit to/from stand   -MH      Reps 4 20  -MH         Exercise 5    Reps 5 2  -MH      Time 5 30 sec hold each  -MH      Time (Seconds) 5 Incline Gastroc/Soleus S  -MH         Exercise 6    Reps 6 20  -MH      Additional Comments 4 inch step  -MH      Time (Seconds) 6 retro steup up/fwd step down  -MH         Exercise 7    Reps 7 20  -MH      Additional Comments 6 inch  -MH      Time (Seconds) 7 Fwd Step Up  -MH         Exercise 8    Exercise Name 8 Lat Step Up  -MH      Reps 8 20  -MH      Additional Comments 6 inch  -MH         Exercise 9    Exercise Name 9 SLS B   -MH      Reps 9 3  -MH      Time 9 30 sec hold  -MH      Additional Comments occasional finger/hand touch down  -MH          Exercise 10    Exercise Name 10 ECC Heel Raises  -      Reps 10 20  -         Exercise 11    Exercise Name 11 Bridges  -      Reps 11 20  -      Time 11 5 sec hold  -        User Key  (r) = Recorded By, (t) = Taken By, (c) = Cosigned By    Initials Name Provider Type    Bere Rosario PTA Physical Therapy Assistant                               PT OP Goals     Row Name 08/28/18 1500          PT Short Term Goals    STG Date to Achieve 09/05/18  -     STG 1 I with HEP and have additions/changes by next recertification.  -     STG 1 Progress Partially Met;Ongoing  -     STG 2 R hip flexion 5/5.  -     STG 2 Progress Ongoing  -     STG 3 Improved R hip PROm abd >= 40°.  -     STG 3 Progress Ongoing;Progressing  -     STG 4 Patient to be compliant with heel wedge wear.  -     STG 4 Progress Ongoing;Progressing  -     STG 5 Patient to report the bottom of his foot is feeling better with no TTP in plantar fiscia.  -     STG 5 Progress Ongoing  -        Long Term Goals    LTG Date to Achieve 09/21/18  -     LTG 1 AROM for R hip all WFL, no increase in pain.  -     LTG 2 B LE 5/5, no increase in pain.  -     LTG 3 patient able to ambulate for 10 minutes with no increase in pain.  -     LTG 4 Patient able to ambulate up/down 3 steps reciprocally x10 reps with no increase in pain.  -     LTG 5 I with final HEP.  -     LTG 6 D/C with a final HEP and free 30 day fitness formula membership.  -        Time Calculation    PT Goal Re-Cert Due Date 09/05/18  -       User Key  (r) = Recorded By, (t) = Taken By, (c) = Cosigned By    Initials Name Provider Type    Bere Rosario PTA Physical Therapy Assistant          Therapy Education  Given: HEP, Symptoms/condition management, Pain management  Program: Reinforced  How Provided: Verbal, Demonstration  Provided to: Patient  Level of Understanding: Verbalized, Demonstrated              Time Calculation:   Start Time:  1523  Stop Time: 1620  Time Calculation (min): 57 min  PT Non-Billable Time (min): 15 min  Total Timed Code Minutes- PT: 42 minute(s)    Therapy Charges for Today     Code Description Service Date Service Provider Modifiers Qty    66479576234 HC PT THER PROC EA 15 MIN 8/28/2018 Bere Barrett PTA GP 3    89923485659 HC PT THER SUPP EA 15 MIN 8/28/2018 Bere Barrett PTA GP 1                    Bere Barrett PTA  8/28/2018

## 2018-08-30 ENCOUNTER — HOSPITAL ENCOUNTER (OUTPATIENT)
Dept: PHYSICAL THERAPY | Facility: HOSPITAL | Age: 38
Setting detail: THERAPIES SERIES
Discharge: HOME | End: 2018-08-30

## 2018-08-30 DIAGNOSIS — M25.551 PAIN OF RIGHT HIP JOINT: ICD-10-CM

## 2018-08-30 DIAGNOSIS — S32.401B: Primary | ICD-10-CM

## 2018-08-30 DIAGNOSIS — M25.571 RIGHT ANKLE PAIN, UNSPECIFIED CHRONICITY: ICD-10-CM

## 2018-08-30 PROCEDURE — 97110 THERAPEUTIC EXERCISES: CPT

## 2018-09-04 ENCOUNTER — HOSPITAL ENCOUNTER (OUTPATIENT)
Dept: PHYSICAL THERAPY | Facility: HOSPITAL | Age: 38
Setting detail: THERAPIES SERIES
Discharge: HOME | End: 2018-09-04

## 2018-09-04 DIAGNOSIS — M25.571 RIGHT ANKLE PAIN, UNSPECIFIED CHRONICITY: ICD-10-CM

## 2018-09-04 DIAGNOSIS — S32.401B: Primary | ICD-10-CM

## 2018-09-04 DIAGNOSIS — M25.551 PAIN OF RIGHT HIP JOINT: ICD-10-CM

## 2018-09-04 NOTE — THERAPY TREATMENT NOTE
Outpatient Physical Therapy Ortho Treatment Note  [facility]     Patient Name: Bogdan Thompson  : 1980  MRN: [id number]  Today's Date: 2018      Visit Date: 2018  Pt reports 10/10 pain pre treatment, 10/10 pain post treatment  Reports 0% of improvement.  Attended 5/7 visits.  Insurance available:18 visits   Next MD appt:WILBERTO .  Recertification: 2018.  Visit Dx:    ICD-10-CM ICD-9-CM   1. Open displaced fracture of right acetabulum, unspecified portion of acetabulum, initial encounter (CMS/[facility]) S32.401B 808.1   2. Pain of right hip joint M25.551 719.45   3. Right ankle pain, unspecified chronicity M25.577 719.47       There is no problem list on file for this patient.       Past Medical History:   Diagnosis Date   • Anxiety    • Hypertension    • Insomnia         Past Surgical History:   Procedure Laterality Date   • HIP FRACTURE SURGERY Right 2017             PT Ortho     Row Name 18 1500       Subjective Comments    Subjective Comments pt has not been taking his bp medication. pt has been out for 1 week per patient. pt had elevated bp last visit. PTA called doctor office up stairs to see if someone could see him. Doctor nurse recommended for pt to go to ER and let them get him back on medication and get him a family doctor to follow up with him. PTA from last visit reommended for pt to go to a walk in clinic to have someone check him out secondary to high blood pressure but pt declined. PTA unable to see pt today for treatment secondary to /110 heart rate 76. Pt stated that he has been stressed out. PTA offered to help pt by calling to see if pt could be seen upstairs. Staff upstairs instructed pt to go to an ER or walk in clinic. Treatment held this date secondary to b/p   -TL       Precautions and Contraindications    Precautions WBAT  -TL       Subjective Pain    Able to rate subjective pain? yes  -TL    Pre-Treatment Pain Level 10  -TL     Post-Treatment Pain Level 10  -TL      User Key  (r) = Recorded By, (t) = Taken By, (c) = Cosigned By    Initials Name Provider Type    MYNOR Alexus Garner PTA Physical Therapy Assistant                            PT Assessment/Plan     Row Name 09/04/18 1600          PT Assessment    Assessment Comments treatment held secodnary to high bp 170/110. PTA called upstairs to see if doctor could see pt but unable. They recommended for pt to go to ER or a walk in clinic. pt was suppose to follow up before this visit to see a doctor to get medication. Pt did not follow after recommendation from previous therapist. Therapist notified.  -TL        PT Plan    PT Frequency 2x/week  -TL     PT Plan Comments assess bp  -TL       User Key  (r) = Recorded By, (t) = Taken By, (c) = Cosigned By    Initials Name Provider Type    MYNOR GarnerAlexus COLEEN SUE Physical Therapy Assistant                    Exercises     Row Name 09/04/18 1500             Subjective Comments    Subjective Comments pt has not been taking his bp medication. pt has been out for 1 week per patient. pt had elevated bp last visit. PTA called doctor office up stairs to see if someone could see him. Doctor nurse recommended for pt to go to ER and let them get him back on medication and get him a family doctor to follow up with him. PTA from last visit reommended for pt to go to a walk in clinic to have someone check him out secondary to high blood pressure but pt declined. PTA unable to see pt today for treatment secondary to /110 heart rate 76. Pt stated that he has been stressed out. PTA offered to help pt by calling to see if pt could be seen upstairs. Staff upstairs instructed pt to go to an ER or walk in clinic. Treatment held this date secondary to b/p   -TL         Subjective Pain    Able to rate subjective pain? yes  -TL      Pre-Treatment Pain Level 10  -TL      Post-Treatment Pain Level 10  -TL        User Key  (r) = Recorded By, (t) = Taken By, (c)  = Cosigned By    Initials Name Provider Type    TL Alexus Garner, SUE Physical Therapy Assistant                                            Time Calculation:      Therapy Suggested Charges     Code   Minutes Charges    None           Therapy Charges for Today     Code Description Service Date Service Provider Modifiers Qty    96383143784 HC PT THER SUPP EA 15 MIN 9/4/2018 Alexus Garner, SUE GP 2                    Alexus Garner PTA  9/4/2018

## 2018-09-05 ENCOUNTER — TELEPHONE (OUTPATIENT)
Dept: PHYSICAL THERAPY | Facility: HOSPITAL | Age: 38
End: 2018-09-05

## 2018-09-10 ENCOUNTER — APPOINTMENT (OUTPATIENT)
Dept: PHYSICAL THERAPY | Facility: HOSPITAL | Age: 38
End: 2018-09-10

## 2018-09-12 ENCOUNTER — APPOINTMENT (OUTPATIENT)
Dept: SPEECH THERAPY | Facility: HOSPITAL | Age: 38
End: 2018-09-12

## 2018-09-12 ENCOUNTER — APPOINTMENT (OUTPATIENT)
Dept: PHYSICAL THERAPY | Facility: HOSPITAL | Age: 38
End: 2018-09-12

## 2018-09-14 ENCOUNTER — APPOINTMENT (OUTPATIENT)
Dept: PHYSICAL THERAPY | Facility: HOSPITAL | Age: 38
End: 2018-09-14

## 2018-09-17 ENCOUNTER — HOSPITAL ENCOUNTER (OUTPATIENT)
Dept: PHYSICAL THERAPY | Facility: HOSPITAL | Age: 38
Setting detail: THERAPIES SERIES
Discharge: HOME | End: 2018-09-17

## 2018-09-17 DIAGNOSIS — S32.401B: ICD-10-CM

## 2018-09-17 DIAGNOSIS — M25.571 RIGHT ANKLE PAIN, UNSPECIFIED CHRONICITY: ICD-10-CM

## 2018-09-17 DIAGNOSIS — M25.551 PAIN OF RIGHT HIP JOINT: Primary | ICD-10-CM

## 2018-09-17 PROCEDURE — 97110 THERAPEUTIC EXERCISES: CPT | Performed by: PHYSICAL THERAPIST

## 2018-09-17 NOTE — THERAPY PROGRESS REPORT/RE-CERT
"    Outpatient Physical Therapy Ortho Progress Note  [facility]  Holley Barbour, PT, DPT, CSCS       Patient Name: Bogdan Thompson  : 1980  MRN: [id number]  Today's Date: 2018      Visit Date: 2018     Pt reports 0/10 pain pre treatment, 6/10 pain post treatment  Reports 60% of improvement.  Attended / visits.  Insurance available: 18 visits  Next MD appt: TBRIP .  Recertification: 10/08/2018.    Visit Dx:    ICD-10-CM ICD-9-CM   1. Pain of right hip joint M25.551 719.45   2. Right ankle pain, unspecified chronicity M25.571 719.47   3. Open displaced fracture of right acetabulum, unspecified portion of acetabulum, initial encounter (CMS/[facility]) S32.401B 808.1       Number of days off work: N/A    Changes to medications: None noted.    Changes to MD orders: None noted.       Past Medical History:   Diagnosis Date   • Anxiety    • Hypertension    • Insomnia         Past Surgical History:   Procedure Laterality Date   • HIP FRACTURE SURGERY Right 2017             PT Ortho     Row Name 18 1300       Precautions and Contraindications    Precautions WBAT  -AJ       Subjective Pain    Able to rate subjective pain? yes  -AJ    Pre-Treatment Pain Level 0  -AJ    Post-Treatment Pain Level 6   \"I'm feeling better today\"  -       Posture/Observations    Posture- WNL Posture is WNL   for B LEs  -    Posture/Observations Comments No acute distress, presents to clinic with poor slumped overal  posture. Reports has been wearing heel lift.  -AJ       Hip Special Tests    Trendelenberg sign (gluteus medius weakness) Bilateral:;Negative  -AJ    JAG (hip vs SI pathology) Bilateral:;Negative  -AJ    Clifford test (tightness of ITB) Bilateral:;Negative  -AJ    Hip scour test (labral vs hip pathology) Bilateral:;Negative  -AJ       General ROM    GENERAL ROM COMMENTS AROm for B LE all WNL including B hips, knees, and ank;les. B hip abd >40° now.  -       MMT (Manual Muscle " Testing)    General MMT Comments B LE 5/5, except R hip flex/abd/add 4/5, ext 4+/5  -AJ       Sensation    Sensation WNL? WNL  -AJ    Light Touch No apparent deficits  -AJ    Additional Comments Denies any numbness or tingling.  -AJ       Lower Extremity Flexibility    Hamstrings Bilateral:;Mildly limited  -AJ    Hip Flexors Bilateral:;Mildly limited  -AJ    Hip Adductors Bilateral:;Moderately limited  -AJ    Gastrocnemius Bilateral:;WNL  -AJ    Soleus Bilateral:;WNL  -AJ       Transfers    Comment (Transfers) I with all transfers.  -AJ       Gait/Stairs Assessment/Training    Comment (Gait/Stairs) FWB, non-antalgic gait, no distress.  -AJ      User Key  (r) = Recorded By, (t) = Taken By, (c) = Cosigned By    Initials Name Provider Type    Holley Shah, PT Physical Therapist         Barriers to Rehab:Include significant or possible arthritic/degenerative changes that have occurred within the joint, Possible poor/questionable compliance with HEP, Possible monetary/secondary gain.    Safety Issues: Blood Pressure          PT Assessment/Plan     Row Name 09/17/18 1300          PT Assessment    Functional Limitations Limitation in home management;Limitations in community activities;Performance in leisure activities;Performance in sport activities  -     Impairments Endurance;Impaired flexibility;Impaired muscle endurance;Impaired muscle length;Impaired muscle power;Sensation;Range of motion;Posture;Poor body mechanics;Pain;Muscle strength;Joint mobility  -     Assessment Comments progressing wll overall, still significant weakness around the R hip.  -AJ     Rehab Potential Good  -AJ     Patient/caregiver participated in establishment of treatment plan and goals Yes  -AJ     Patient would benefit from skilled therapy intervention Yes  -AJ        PT Plan    PT Frequency 2x/week  -AJ     Predicted Duration of Therapy Intervention (Therapy Eval) 2-3 more weeks, 4-6 more visits  -AJ     Planned CPT's? PT  "RE-EVAL: 36291;PT THER PROC EA 15 MIN: 53662;PT THER ACT EA 15 MIN: 66539;PT MANUAL THERAPY EA 15 MIN: 66276;PT THER SUPP EA 15 MIN  -AJ     Physical Therapy Interventions (Optional Details) balance training;gait training;gross motor skills;home exercise program;lumbar stabilization;manual therapy techniques;modalities;patient/family education;postural re-education;ROM (Range of Motion);strengthening;stretching  -AJ     PT Plan Comments Monitor BP, concentrate on hip stab and add marble  next session. Anticipate D/C at next recertification.  -AJ       User Key  (r) = Recorded By, (t) = Taken By, (c) = Cosigned By    Initials Name Provider Type    Holley Shah, PT Physical Therapist       Other therapeutic activities and/or exercises will be prescribed depending on the patients progress or lack there of.          Modalities     Row Name 09/17/18 1300             Ice    Ice Applied Yes  -AJ      Location R ant hip  -AJ      Rx Minutes 15 mins  -AJ      Ice S/P Rx Yes  -AJ        User Key  (r) = Recorded By, (t) = Taken By, (c) = Cosigned By    Initials Name Provider Type    Holley Shah, PT Physical Therapist                Exercises     Row Name 09/17/18 1300             Subjective Pain    Able to rate subjective pain? yes  -AJ      Pre-Treatment Pain Level 0  -AJ      Post-Treatment Pain Level 6   \"I'm feeling better today\"  -AJ         Exercise 1    Exercise Name 1 BP check  -AJ      Additional Comments 132/92  -AJ         Exercise 2    Exercise Name 2 Pro II LEs  -AJ      Time 2 10 minutes  -AJ         Exercise 3    Exercise Name 3 B St. HS S  -AJ      Reps 3 2  -AJ      Time 3 30 seconds  -AJ         Exercise 4    Reps 4 2  -AJ      Time 4 30 seconds  -AJ      Time (Seconds) 4 B St. Gastroc/Soleus S  -AJ         Exercise 5    Reps 5 2  -AJ      Time 5 30 seconds  -AJ      Time (Seconds) 5 B sitting piriformis S  -AJ         Exercise 6    Reps 6 10  -AJ      Time (Seconds) 6 3 " "reciprical steps  -AJ         Exercise 7    Time 7 3 minutes  -AJ      Time (Seconds) 7 Can roll  -AJ         Exercise 8    Exercise Name 8 B Step up with opp hip ext  -AJ      Reps 8 20 each LE  -AJ      Additional Comments 6\" step  -AJ         Exercise 9    Exercise Name 9 B Step up lat with opp hip abd  -AJ      Reps 9 20  -AJ      Additional Comments 6\" step  -AJ         Exercise 10    Exercise Name 10 Prone TKE with GS  -AJ      Reps 10 20  -AJ      Time 10 5\" hold  -AJ        User Key  (r) = Recorded By, (t) = Taken By, (c) = Cosigned By    Initials Name Provider Type    Holley Shah, PT Physical Therapist                               PT OP Goals     Row Name 09/17/18 1300          PT Short Term Goals    STG Date to Achieve 09/05/18  -     STG 1 I with HEP and have additions/changes by next recertification.  -AJ     STG 1 Progress Partially Met;Ongoing  -     STG 1 Progress Comments Required cueins for HEP exercises/S today  -AJ     STG 2 R hip flexion 5/5.  -AJ     STG 2 Progress Ongoing  -     STG 3 Improved R hip PROm abd >= 40°.  -AJ     STG 3 Progress Met  -     STG 4 Patient to be compliant with heel wedge wear.  -     STG 4 Progress Met  -     STG 5 Patient to report the bottom of his foot is feeling better with no TTP in plantar fiscia.  -     STG 5 Progress Ongoing;Partially Met  -        Long Term Goals    LTG Date to Achieve 09/21/18  -     LTG 1 AROM for R hip all WFL, no increase in pain.  -     LTG 1 Progress Met  -     LTG 2 B LE 5/5, no increase in pain.  -     LTG 2 Progress Ongoing;Progressing  -     LTG 3 patient able to ambulate for 10 minutes with no increase in pain.  -     LTG 4 Patient able to ambulate up/down 3 steps reciprocally x10 reps with no increase in pain.  -     LTG 4 Progress Met  -     LTG 5 I with final HEP.  -     LTG 6 D/C with a final HEP and free 30 day fitness formula membership.  -        Time Calculation    PT Goal " Re-Cert Due Date 10/08/18  -SHIRLEY       User Key  (r) = Recorded By, (t) = Taken By, (c) = Cosigned By    Initials Name Provider Type    Holley Shah, PT Physical Therapist          Therapy Education  Given: HEP, Symptoms/condition management, Pain management (POC)  Program: Reinforced  How Provided: Verbal  Provided to: Patient  Level of Understanding: Verbalized    Outcome Measure Options: Lower Extremity Functional Scale (LEFS)  Lower Extremity Functional Index  Any of your usual work, housework or school activities: A little bit of difficulty  Your usual hobbies, recreational or sporting activities: Quite a bit of difficulty  Getting into or out of the bath: Moderate difficulty  Walking between rooms: Quite a bit of difficulty  Putting on your shoes or socks: Quite a bit of difficulty  Squatting: Quite a bit of difficulty  Lifting an object, like a bag of groceries from the floor: Extreme difficulty or unable to perform activity  Performing light activities around your home: Quite a bit of difficulty  Performing heavy activities around your home: No difficulty  Getting into or out of a car: No difficulty  Walking 2 blocks: No difficulty  Walking a mile: No difficulty  Going up or down 10 stairs (about 1 flight of stairs): No difficulty  Standing for 1 hour: Quite a bit of difficulty  Sitting for 1 hour: No difficulty  Running on even ground: Extreme difficulty or unable to perform activity  Running on uneven ground: Moderate difficulty  Making sharp turns while running fast: No difficulty  Hopping: No difficulty  Rolling over in bed: No difficulty  Total: 49      Time Calculation:   Start Time: 1345  Stop Time: 1445  Time Calculation (min): 60 min  PT Non-Billable Time (min): 15 min  Total Timed Code Minutes- PT: 45 minute(s)    Therapy Charges for Today     Code Description Service Date Service Provider Modifiers Qty    95478629220 HC PT THER PROC EA 15 MIN 9/17/2018 Holley Barbour, PT GP 3     [id number]  PT THER SUPP EA 15 MIN 9/17/2018 Holley Barbour, PT GP 1          PT G-Codes  Outcome Measure Options: Lower Extremity Functional Scale (LEFS)  Total: 49         Holley Barbour PT, DPT, CSCS  9/17/2018

## 2018-09-19 ENCOUNTER — HOSPITAL ENCOUNTER (OUTPATIENT)
Dept: PHYSICAL THERAPY | Facility: HOSPITAL | Age: 38
Setting detail: THERAPIES SERIES
Discharge: HOME | End: 2018-09-19

## 2018-09-19 DIAGNOSIS — M25.571 RIGHT ANKLE PAIN, UNSPECIFIED CHRONICITY: Primary | ICD-10-CM

## 2018-09-19 DIAGNOSIS — S32.401B: ICD-10-CM

## 2018-09-19 DIAGNOSIS — M25.551 PAIN OF RIGHT HIP JOINT: ICD-10-CM

## 2018-09-19 PROCEDURE — 97110 THERAPEUTIC EXERCISES: CPT

## 2018-09-19 NOTE — THERAPY TREATMENT NOTE
"    Outpatient Physical Therapy Ortho Treatment Note  [facility]     Patient Name: Bogdan Thompson  : 1980  MRN: [id number]  Today's Date: 2018      Visit Date: 2018  Pt reports 6/10 pain pre treatment, 0/10 pain post treatment  Reports 60% of improvement.  Attended 7/12 visits.  Insurance available: 18 visits  Next MD appt:TBRIP  .  Recertification: 10/08/2018.  Visit Dx:    ICD-10-CM ICD-9-CM   1. Right ankle pain, unspecified chronicity M25.571 719.47   2. Open displaced fracture of right acetabulum, unspecified portion of acetabulum, initial encounter (CMS/[facility]) S32.401B 808.1   3. Pain of right hip joint M25.551 719.45       There is no problem list on file for this patient.       Past Medical History:   Diagnosis Date   • Anxiety    • Hypertension    • Insomnia         Past Surgical History:   Procedure Laterality Date   • HIP FRACTURE SURGERY Right 2017             PT Ortho     Row Name 18 1400       Subjective Comments    Subjective Comments pt reports that he is 60% improved per patient.  -TL       Precautions and Contraindications    Precautions WBAT  -TL       Subjective Pain    Able to rate subjective pain? yes  -TL    Pre-Treatment Pain Level 6  -TL    Post-Treatment Pain Level 0  -TL    Row Name 18 1300       Precautions and Contraindications    Precautions WBAT  -AJ       Subjective Pain    Able to rate subjective pain? yes  -AJ    Pre-Treatment Pain Level 0  -AJ    Post-Treatment Pain Level 6   \"I'm feeling better today\"  -AJ       Posture/Observations    Posture- WNL Posture is WNL   for B LEs  -AJ    Posture/Observations Comments No acute distress, presents to clinic with poor slumped overal  posture. Reports has been wearing heel lift.  -AJ       Hip Special Tests    Trendelenberg sign (gluteus medius weakness) Bilateral:;Negative  -AJ    JAG (hip vs SI pathology) Bilateral:;Negative  -AJ    Clifford test (tightness of ITB) Bilateral:;Negative  " -AJ    Hip scour test (labral vs hip pathology) Bilateral:;Negative  -AJ       General ROM    GENERAL ROM COMMENTS AROm for B LE all WNL including B hips, knees, and ank;les. B hip abd >40° now.  -AJ       MMT (Manual Muscle Testing)    General MMT Comments B LE 5/5, except R hip flex/abd/add 4/5, ext 4+/5  -AJ       Sensation    Sensation WNL? WNL  -AJ    Light Touch No apparent deficits  -AJ    Additional Comments Denies any numbness or tingling.  -AJ       Lower Extremity Flexibility    Hamstrings Bilateral:;Mildly limited  -AJ    Hip Flexors Bilateral:;Mildly limited  -AJ    Hip Adductors Bilateral:;Moderately limited  -AJ    Gastrocnemius Bilateral:;WNL  -AJ    Soleus Bilateral:;WNL  -AJ       Transfers    Comment (Transfers) I with all transfers.  -AJ       Gait/Stairs Assessment/Training    Comment (Gait/Stairs) FWB, non-antalgic gait, no distress.  -AJ      User Key  (r) = Recorded By, (t) = Taken By, (c) = Cosigned By    Initials Name Provider Type    AJ Holley Barbour, PT Physical Therapist    Alexus Galvan PTA Physical Therapy Assistant                            PT Assessment/Plan     Row Name 09/19/18 1600          PT Assessment    Assessment Comments pt tolerated marble  well without much pain in ankle. Pt tolerated new ex step up with hip ext. No new goals met at this time.  -TL        PT Plan    PT Frequency 2x/week  -TL     PT Plan Comments continue to monitor bp  -TL       User Key  (r) = Recorded By, (t) = Taken By, (c) = Cosigned By    Initials Name Provider Type    Alexus Galvan PTA Physical Therapy Assistant                Modalities     Row Name 09/19/18 1400             Ice    Ice Applied Yes  -TL      Location R ant hip  -TL      Rx Minutes 15 mins  -TL      Ice S/P Rx Yes  -TL        User Key  (r) = Recorded By, (t) = Taken By, (c) = Cosigned By    Initials Name Provider Type    Alexus Galvan PTA Physical Therapy Assistant                Exercises     Row  Name 09/19/18 1400 09/19/18 1300          Subjective Comments    Subjective Comments pt reports that he is 60% improved per patient.  -TL  --        Subjective Pain    Able to rate subjective pain? yes  -TL  --     Pre-Treatment Pain Level 6  -TL  --     Post-Treatment Pain Level 0  -TL  --        Exercise 1    Exercise Name 1  -- bp check  -TL     Additional Comments  -- 148/80  -TL        Exercise 2    Exercise Name 2  -- Pro ll legs  -TL     Time 2  -- 10 mins  -TL     Additional Comments  -- level 5  -TL        Exercise 3    Exercise Name 3  -- B St. HS S  -TL     Reps 3  -- 2  -TL     Time 3  -- 30 sec hold  -TL        Exercise 4    Reps 4  -- 2  -TL     Time 4  -- 30 sec  -TL     Time (Seconds) 4  -- B St. Gastroc/Soleus S  -TL        Exercise 5    Reps 5  -- 2  -TL     Time 5  -- 30 sec hold  -TL     Time (Seconds) 5  -- B sitting piriformis S  -TL        Exercise 6    Sets 6  -- 1  -TL     Reps 6  -- 15  -TL     Time (Seconds) 6  -- step up with hip ext  -TL        Exercise 7    Reps 7  -- 20  -TL     Time (Seconds) 7  -- step up lateral  -TL        Exercise 8    Exercise Name 8  -- marble pick ups  -TL     Reps 8  -- 2  -TL     Additional Comments  -- EV/IV  -TL        Exercise 9    Exercise Name 9  -- --  -TL       User Key  (r) = Recorded By, (t) = Taken By, (c) = Cosigned By    Initials Name Provider Type    TL Alexus Garner, PTA Physical Therapy Assistant                               PT OP Goals     Row Name 09/19/18 1600 09/19/18 1345       PT Short Term Goals    STG Date to Achieve  -- 09/05/18  -TL    STG 1  -- I with HEP and have additions/changes by next recertification.  -TL    STG 1 Progress  -- Partially Met;Ongoing  -TL    STG 2  -- R hip flexion 5/5.  -TL    STG 2 Progress  -- Ongoing  -TL    STG 3  -- Improved R hip PROm abd >= 40°.  -TL    STG 3 Progress  -- Met  -TL    STG 4  -- Patient to be compliant with heel wedge wear.  -TL    STG 4 Progress  -- Met  -TL    STG 5  -- Patient to  report the bottom of his foot is feeling better with no TTP in plantar fiscia.  -TL    STG 5 Progress  -- Ongoing;Partially Met  -TL       Long Term Goals    LTG Date to Achieve  -- 09/21/18  -TL    LTG 1  -- AROM for R hip all WFL, no increase in pain.  -TL    LTG 1 Progress  -- Met  -TL    LTG 2  -- B LE 5/5, no increase in pain.  -TL    LTG 2 Progress  -- Ongoing;Progressing  -TL    LTG 3  -- patient able to ambulate for 10 minutes with no increase in pain.  -TL    LTG 4  -- Patient able to ambulate up/down 3 steps reciprocally x10 reps with no increase in pain.  -TL    LTG 4 Progress  -- Met  -TL    LTG 5  -- I with final HEP.  -TL    LTG 6  -- D/C with a final HEP and free 30 day fitness formula membership.  -TL       Time Calculation    PT Goal Re-Cert Due Date 10/08/18  -TL 10/08/18  -TL      User Key  (r) = Recorded By, (t) = Taken By, (c) = Cosigned By    Initials Name Provider Type     Alexus Garner PTA Physical Therapy Assistant          Therapy Education  Given: HEP, Symptoms/condition management, Pain management, Posture/body mechanics  Program: Reinforced  How Provided: Verbal  Provided to: Patient  Level of Understanding: Verbalized              Time Calculation:   Start Time: 1345  Stop Time: 1440  Time Calculation (min): 55 min  PT Non-Billable Time (min): 15 min  Total Timed Code Minutes- PT: 40 minute(s)  Therapy Suggested Charges     Code   Minutes Charges    None           Therapy Charges for Today     Code Description Service Date Service Provider Modifiers Qty    92087435733 HC PT THER PROC EA 15 MIN 9/19/2018 Alexus Garner PTA GP 3    20441074056 HC PT THER SUPP EA 15 MIN 9/19/2018 Alexus Garner PTA GP 1                    Alexus Garner PTA  9/19/2018

## 2018-09-20 ENCOUNTER — TELEPHONE (OUTPATIENT)
Dept: FAMILY MEDICINE CLINIC | Facility: CLINIC | Age: 38
End: 2018-09-20

## 2018-09-24 ENCOUNTER — HOSPITAL ENCOUNTER (OUTPATIENT)
Dept: PHYSICAL THERAPY | Facility: HOSPITAL | Age: 38
Setting detail: THERAPIES SERIES
Discharge: HOME | End: 2018-09-24

## 2018-09-24 DIAGNOSIS — S32.401B: ICD-10-CM

## 2018-09-24 DIAGNOSIS — M25.571 RIGHT ANKLE PAIN, UNSPECIFIED CHRONICITY: Primary | ICD-10-CM

## 2018-09-24 DIAGNOSIS — M25.551 PAIN OF RIGHT HIP JOINT: ICD-10-CM

## 2018-09-24 PROCEDURE — 97110 THERAPEUTIC EXERCISES: CPT

## 2018-09-24 NOTE — THERAPY TREATMENT NOTE
Outpatient Physical Therapy Ortho Treatment Note  [facility]     Patient Name: Bogdan Thompson  : 1980  MRN: [id number]  Today's Date: 2018      Visit Date: 2018  Pt reports 0/10 pain pre treatment, 1/10 pain post treatment  Reports 80% of improvement.  Attended 8/13 visits.  Insurance available:18 visits   Next MD appt:WILBERTO  .  Recertification: 10/08/2018.  Visit Dx:    ICD-10-CM ICD-9-CM   1. Right ankle pain, unspecified chronicity M25.571 719.47   2. Open displaced fracture of right acetabulum, unspecified portion of acetabulum, initial encounter (CMS/[facility]) S32.401B 808.1   3. Pain of right hip joint M25.551 719.45       There is no problem list on file for this patient.       Past Medical History:   Diagnosis Date   • Anxiety    • Hypertension    • Insomnia         Past Surgical History:   Procedure Laterality Date   • HIP FRACTURE SURGERY Right 2017             PT Ortho     Row Name 18 1300       Subjective Comments    Subjective Comments Pt reports that he is 80% better. Pt denies pain this date.  -TL       Precautions and Contraindications    Precautions WBAT  -TL       Subjective Pain    Able to rate subjective pain? yes  -TL    Pre-Treatment Pain Level 0  -TL    Post-Treatment Pain Level 1  -TL      User Key  (r) = Recorded By, (t) = Taken By, (c) = Cosigned By    Initials Name Provider Type    Alexus Galvan PTA Physical Therapy Assistant                            PT Assessment/Plan     Row Name 18 1400          PT Assessment    Assessment Comments Pt has met 3-5 short term goals ,LTGs 1,3 and 4 goals met. Pt progressing toward goals. No pain with 10 min walk today.  -TL        PT Plan    PT Frequency 2x/week  -TL     PT Plan Comments do neural s next visit on right foot  -TL       User Key  (r) = Recorded By, (t) = Taken By, (c) = Cosigned By    Initials Name Provider Type    Alexus Galvan PTA Physical Therapy Assistant                 Modalities     Row Name 09/24/18 1300             Ice    Ice Applied Yes  -TL      Location right ant hip  -TL      Rx Minutes 10 mins  -TL      Ice S/P Rx Yes  -TL        User Key  (r) = Recorded By, (t) = Taken By, (c) = Cosigned By    Initials Name Provider Type    TL Alexus Garner PTA Physical Therapy Assistant                Exercises     Row Name 09/24/18 1300             Subjective Comments    Subjective Comments Pt reports that he is 80% better. Pt denies pain this date.  -TL         Subjective Pain    Able to rate subjective pain? yes  -TL      Pre-Treatment Pain Level 0  -TL      Post-Treatment Pain Level 1  -TL         Exercise 1    Exercise Name 1 Pro ll legs  -TL      Time 1 10 mins  -TL      Additional Comments level 7  -TL         Exercise 2    Exercise Name 2 B.St HS S  -TL      Reps 2 2  -TL      Time 2 30 sec hold  -TL         Exercise 3    Exercise Name 3 B St gastroc/soleus  -TL      Reps 3 2  -TL      Time 3 30  -TL         Exercise 4    Reps 4 2  -TL      Time 4 30 sec hold  -TL      Time (Seconds) 4 B sitting pirif S  -TL         Exercise 5    Reps 5 20  -TL      Time 5 5 sec hold  -TL      Time (Seconds) 5 Bridges  -TL         Exercise 6    Reps 6 20  -TL      Time 6 5 sec hold  -TL      Time (Seconds) 6 SLR Fwd  -TL         Exercise 7    Reps 7 20  -TL      Time 7 5 sec hold  -TL      Time (Seconds) 7 SLR hip abd  -TL         Exercise 8    Exercise Name 8 gym walk   -TL      Time 8 10 mins  -TL         Exercise 9    Exercise Name 9 can rolls  -TL      Time 9 5 mins  -TL         Exercise 10    Exercise Name 10 4-way TB ankle  -TL      Additional Comments RTB  -TL        User Key  (r) = Recorded By, (t) = Taken By, (c) = Cosigned By    Initials Name Provider Type    TL Alexus Garner PTA Physical Therapy Assistant                               PT OP Goals     Row Name 09/24/18 1343          PT Short Term Goals    STG Date to Achieve 09/05/18  -TL     STG 1 I with HEP and have  additions/changes by next recertification.  -TL     STG 1 Progress Partially Met;Ongoing  -TL     STG 2 R hip flexion 5/5.  -TL     STG 2 Progress Ongoing;Progressing  -TL     STG 3 Improved R hip PROm abd >= 40°.  -TL     STG 3 Progress Met  -TL     STG 4 Patient to be compliant with heel wedge wear.  -TL     STG 4 Progress Met  -TL     STG 5 Patient to report the bottom of his foot is feeling better with no TTP in plantar fiscia.  -TL     STG 5 Progress Met  -TL        Long Term Goals    LTG Date to Achieve 09/21/18  -TL     LTG 1 AROM for R hip all WFL, no increase in pain.  -TL     LTG 1 Progress Met  -TL     LTG 2 B LE 5/5, no increase in pain.  -TL     LTG 2 Progress Ongoing;Progressing  -TL     LTG 3 patient able to ambulate for 10 minutes with no increase in pain.  -TL     LTG 3 Progress Met  -TL     LTG 4 Patient able to ambulate up/down 3 steps reciprocally x10 reps with no increase in pain.  -TL     LTG 4 Progress Met  -TL     LTG 5 I with final HEP.  -TL     LTG 6 D/C with a final HEP and free 30 day fitness formula membership.  -TL        Time Calculation    PT Goal Re-Cert Due Date 10/08/18  -TL       User Key  (r) = Recorded By, (t) = Taken By, (c) = Cosigned By    Initials Name Provider Type    Alexus Galvan PTA Physical Therapy Assistant          Therapy Education  Education Details: gastroc/soleus  Given: HEP, Symptoms/condition management, Pain management  How Provided: Verbal, Demonstration, Written  Provided to: Patient  Level of Understanding: Verbalized, Demonstrated              Time Calculation:   Start Time: 1343  Stop Time: 1445  Time Calculation (min): 62 min  PT Non-Billable Time (min): 10 min  Total Timed Code Minutes- PT: 52 minute(s)  Therapy Suggested Charges     Code   Minutes Charges    None           Therapy Charges for Today     Code Description Service Date Service Provider Modifiers Qty    20746024164 HC PT THER PROC EA 15 MIN 9/24/2018 Alexus Garner PTA GP 3     [id number]  PT THER SUPP EA 15 MIN 9/24/2018 Alexus Garner, PTA GP 1                    Alexus Garner, SUE  9/24/2018

## 2018-09-26 ENCOUNTER — HOSPITAL ENCOUNTER (OUTPATIENT)
Dept: PHYSICAL THERAPY | Facility: HOSPITAL | Age: 38
Setting detail: THERAPIES SERIES
Discharge: HOME | End: 2018-09-26

## 2018-09-26 DIAGNOSIS — S32.401B: ICD-10-CM

## 2018-09-26 DIAGNOSIS — M25.571 RIGHT ANKLE PAIN, UNSPECIFIED CHRONICITY: Primary | ICD-10-CM

## 2018-09-26 DIAGNOSIS — M25.551 PAIN OF RIGHT HIP JOINT: ICD-10-CM

## 2018-09-26 PROCEDURE — 97110 THERAPEUTIC EXERCISES: CPT

## 2018-09-26 NOTE — THERAPY TREATMENT NOTE
Outpatient Physical Therapy Ortho Treatment Note  [facility]     Patient Name: Bogdan Thompson  : 1980  MRN: [id number]  Today's Date: 2018      Visit Date: 2018  Pt reports 0/10 pain pre treatment, 0/10 pain post treatment  Reports 80% of improvement.  Attended /14 visits.  Insurance available: 18 visits approved  Next MD appt: WILBERTO .  Recertification: 10/08/2018.  Visit Dx:    ICD-10-CM ICD-9-CM   1. Right ankle pain, unspecified chronicity M25.571 719.47   2. Open displaced fracture of right acetabulum, unspecified portion of acetabulum, initial encounter (CMS/[facility]) S32.401B 808.1   3. Pain of right hip joint M25.551 719.45       There is no problem list on file for this patient.       Past Medical History:   Diagnosis Date   • Anxiety    • Hypertension    • Insomnia         Past Surgical History:   Procedure Laterality Date   • HIP FRACTURE SURGERY Right 2017             PT Ortho     Row Name 18 1353       Subjective Comments    Subjective Comments Pt denies pain. Pt stated that he scheduled an appt with the doctor but did not go the other day.  -TL       Precautions and Contraindications    Precautions WBAT  -TL       Subjective Pain    Able to rate subjective pain? yes  -TL    Pre-Treatment Pain Level 0  -TL    Post-Treatment Pain Level 0  -TL    Subjective Pain Comment 142/88  -TL    Row Name 18 1300       Subjective Comments    Subjective Comments Pt reports that he is 80% better. Pt denies pain this date.  -TL       Precautions and Contraindications    Precautions WBAT  -TL       Subjective Pain    Able to rate subjective pain? yes  -TL    Pre-Treatment Pain Level 0  -TL    Post-Treatment Pain Level 1  -TL      User Key  (r) = Recorded By, (t) = Taken By, (c) = Cosigned By    Initials Name Provider Type    TL Alexus Garner PTA Physical Therapy Assistant                            PT Assessment/Plan     Row Name 18 1300          PT Assessment     Assessment Comments pt met short term goals 1,3,4and 5, LTG 1-3 and partial 6. Pt tolerated resist walk, BOSU step fwd/laterals, wall squats. Pt given neural glide ankle pumps with LAQ and wall squats for HEP.  -TL        PT Plan    PT Frequency 2x/week  -TL     PT Plan Comments SLS in // bar and check strength.  -TL       User Key  (r) = Recorded By, (t) = Taken By, (c) = Cosigned By    Initials Name Provider Type    Alexus Galvan PTA Physical Therapy Assistant                Modalities     Row Name 09/26/18 1352             Ice    Patient denies application of Ice Yes  -TL      Patient reports will apply ice at home to involved area Yes   ice to go.  -TL        User Key  (r) = Recorded By, (t) = Taken By, (c) = Cosigned By    Initials Name Provider Type    Alexus Galvan PTA Physical Therapy Assistant                Exercises     Row Name 09/26/18 1677             Subjective Comments    Subjective Comments Pt denies pain. Pt stated that he scheduled an appt with the doctor but did not go the other day.  -TL         Subjective Pain    Able to rate subjective pain? yes  -TL      Pre-Treatment Pain Level 0  -TL      Post-Treatment Pain Level 0  -TL      Subjective Pain Comment 142/88  -TL         Exercise 1    Exercise Name 1 Pro ll legs  -TL      Time 1 10 mins  -TL      Additional Comments level 8  -TL         Exercise 2    Exercise Name 2 B St gastroc/soleus S  -TL      Reps 2 2  -TL      Time 2 30 sec hold  -TL         Exercise 3    Exercise Name 3 resist walking fwd/back/lateral  -TL      Time 3 5  -TL      Additional Comments 4 plates  -TL         Exercise 4    Sets 4 10  -TL      Reps 4 10 ankle pumps  -TL      Time (Seconds) 4 neuro glide S ankle pumps with LAQ  -TL         Exercise 5    Time 5 5 mins  -TL      Additional Comments 7 cords  -TL      Time (Seconds) 5 shuttle leg press 2L  -TL         Exercise 6    Reps 6 3 mins  -TL      Additional Comments 6 cords  -TL      Time (Seconds) 6  shuttle leg press 1L  -TL         Exercise 7    Reps 7 20  -TL      Time (Seconds) 7 BOSU step fwd/lateral  -TL         Exercise 8    Exercise Name 8 wall squats  -TL      Reps 8 10  -TL      Time 8 10sec hold  -TL        User Key  (r) = Recorded By, (t) = Taken By, (c) = Cosigned By    Initials Name Provider Type    Alexus Galvan PTA Physical Therapy Assistant                               PT OP Goals     Row Name 09/26/18 1300          PT Short Term Goals    STG Date to Achieve 09/05/18  -TL     STG 1 I with HEP and have additions/changes by next recertification.  -TL     STG 1 Progress Met;Ongoing  -TL     STG 2 R hip flexion 5/5.  -TL     STG 2 Progress Ongoing;Progressing  -TL     STG 3 Improved R hip PROm abd >= 40°.  -TL     STG 3 Progress Met  -TL     STG 4 Patient to be compliant with heel wedge wear.  -TL     STG 4 Progress Met  -TL     STG 5 Patient to report the bottom of his foot is feeling better with no TTP in plantar fiscia.  -TL     STG 5 Progress Met  -TL        Long Term Goals    LTG Date to Achieve 09/21/18  -TL     LTG 1 AROM for R hip all WFL, no increase in pain.  -TL     LTG 1 Progress Met  -TL     LTG 2 B LE 5/5, no increase in pain.  -TL     LTG 2 Progress Ongoing;Progressing  -TL     LTG 3 patient able to ambulate for 10 minutes with no increase in pain.  -TL     LTG 3 Progress Met  -TL     LTG 4 Patient able to ambulate up/down 3 steps reciprocally x10 reps with no increase in pain.  -TL     LTG 4 Progress Met  -TL     LTG 5 I with final HEP.  -TL     LTG 5 Progress Ongoing;Progressing  -TL     LTG 6 D/C with a final HEP and free 30 day fitness formula membership.  -TL     LTG 6 Progress Partially Met  -TL        Time Calculation    PT Goal Re-Cert Due Date 10/08/18  -TL       User Key  (r) = Recorded By, (t) = Taken By, (c) = Cosigned By    Initials Name Provider Type    Alexus Galvan PTA Physical Therapy Assistant          Therapy Education  Education Details: wall squats,  neural glides LAQ with ankle pumps  Given: HEP, Symptoms/condition management, Pain management  Program: Reinforced  How Provided: Verbal, Demonstration, Written  Provided to: Patient  Level of Understanding: Verbalized, Demonstrated              Time Calculation:   Start Time: 1353  Stop Time: 1435  Time Calculation (min): 42 min  Total Timed Code Minutes- PT: 42 minute(s)  Therapy Suggested Charges     Code   Minutes Charges    None           Therapy Charges for Today     Code Description Service Date Service Provider Modifiers Qty    97334814453 HC PT THER PROC EA 15 MIN 9/26/2018 Alexus Garner, PTA GP 3                    Alexus Garner, SUE  9/26/2018

## 2018-10-01 ENCOUNTER — APPOINTMENT (OUTPATIENT)
Dept: PHYSICAL THERAPY | Facility: HOSPITAL | Age: 38
End: 2018-10-01

## 2018-10-03 ENCOUNTER — HOSPITAL ENCOUNTER (OUTPATIENT)
Dept: PHYSICAL THERAPY | Facility: HOSPITAL | Age: 38
Setting detail: THERAPIES SERIES
Discharge: HOME | End: 2018-10-03

## 2018-10-03 DIAGNOSIS — M25.551 PAIN OF RIGHT HIP JOINT: ICD-10-CM

## 2018-10-03 DIAGNOSIS — M25.571 RIGHT ANKLE PAIN, UNSPECIFIED CHRONICITY: Primary | ICD-10-CM

## 2018-10-03 DIAGNOSIS — S32.401B: ICD-10-CM

## 2018-10-03 NOTE — THERAPY TREATMENT NOTE
Outpatient Physical Therapy Ortho Treatment Note  [facility]  Bere Barrett PTA       Patient Name: Bogdan Thompson  : 1980  MRN: [id number]  Today's Date: 10/3/2018      Visit Date: 10/03/2018     Visits:   Insurance Visits Approved:   Recert Due: 10/08/2018  MD Appt: Oct 24  Pain: pretreatment 0/10; post treatment 0/10  Improvement: pt is subjectively reporting 80% improvement since initial evaluation    Visit Dx:    ICD-10-CM ICD-9-CM   1. Right ankle pain, unspecified chronicity M25.571 719.47   2. Open displaced fracture of right acetabulum, unspecified portion of acetabulum, initial encounter (CMS/[facility]) S32.401B 808.1   3. Pain of right hip joint M25.551 719.45       There is no problem list on file for this patient.       Past Medical History:   Diagnosis Date   • Anxiety    • Hypertension    • Insomnia         Past Surgical History:   Procedure Laterality Date   • HIP FRACTURE SURGERY Right 2017             PT Ortho     Row Name 10/03/18 1300       Subjective Comments    Subjective Comments states that he really doesn't have any pain right now. states that he feels about 80% improved overall. states that sometimes he just blocks the pain out. reports that he still has numbness and tingling in the bottom of his right foot only.   -       Precautions and Contraindications    Precautions WBAT  -       Subjective Pain    Able to rate subjective pain? yes  -    Pre-Treatment Pain Level 0  -    Post-Treatment Pain Level 0  -       Posture/Observations    Posture/Observations Comments Pretreatment /100; after 5 mins of rest 158/110; after 10 mins rest 145/104  -       MMT (Manual Muscle Testing)    General MMT Comments B LE 5/5 throughout  -      User Key  (r) = Recorded By, (t) = Taken By, (c) = Cosigned By    Initials Name Provider Type     Bere Barrett PTA Physical Therapy Assistant                            PT Assessment/Plan     Row Name 10/03/18  1300          PT Assessment    Assessment Comments treatment withheld today secondary to elevated blood pressure. patient reporting that he is taking it every day and at the same time. patient verbalizes understanding for need to cancel treatment.   -        PT Plan    PT Frequency 2x/week  -     PT Plan Comments continue to monitor BP, recheck next week   -       User Key  (r) = Recorded By, (t) = Taken By, (c) = Cosigned By    Initials Name Provider Type    Bere Rosario PTA Physical Therapy Assistant                    Exercises     Row Name 10/03/18 1300             Subjective Comments    Subjective Comments states that he really doesn't have any pain right now. states that he feels about 80% improved overall. states that sometimes he just blocks the pain out. reports that he still has numbness and tingling in the bottom of his right foot only.   -         Subjective Pain    Able to rate subjective pain? yes  -      Pre-Treatment Pain Level 0  -      Post-Treatment Pain Level 0  -         Exercise 1    Exercise Name 1 assess BP   -        User Key  (r) = Recorded By, (t) = Taken By, (c) = Cosigned By    Initials Name Provider Type     Bere Barrett PTA Physical Therapy Assistant                               PT OP Goals     Row Name 10/03/18 1300          PT Short Term Goals    STG Date to Achieve 09/05/18  -     STG 1 I with HEP and have additions/changes by next recertification.  -     STG 1 Progress Met;Ongoing  -     STG 2 R hip flexion 5/5.  -     STG 2 Progress Met  [facility]     STG 3 Improved R hip PROm abd >= 40°.  -     STG 3 Progress Met  [facility]     STG 4 Patient to be compliant with heel wedge wear.  -     STG 4 Progress Met  -     STG 5 Patient to report the bottom of his foot is feeling better with no TTP in plantar fiscia.  -     STG 5 Progress Met  [facility]        Long Term Goals    LTG Date to Achieve 09/21/18  -     LTG 1 AROM for R hip all WFL, no increase in pain.   -     LTG 1 Progress Met  -     LTG 2 B LE 5/5, no increase in pain.  -     LTG 2 Progress Met  -     LTG 3 patient able to ambulate for 10 minutes with no increase in pain.  -     LTG 3 Progress Met  -     LTG 4 Patient able to ambulate up/down 3 steps reciprocally x10 reps with no increase in pain.  -     LTG 4 Progress Met  -     LTG 5 I with final HEP.  -     LTG 5 Progress Ongoing;Progressing  -     LTG 6 D/C with a final HEP and free 30 day fitness formula membership.  -     LTG 6 Progress Partially Met  -        Time Calculation    PT Goal Re-Cert Due Date 10/08/18  -       User Key  (r) = Recorded By, (t) = Taken By, (c) = Cosigned By    Initials Name Provider Type     Bere Barrett PTA Physical Therapy Assistant          Therapy Education  Given: HEP, Symptoms/condition management, Pain management  Program: Reinforced  How Provided: Verbal  Provided to: Patient  Level of Understanding: Verbalized              Time Calculation:   Start Time: 1340  Stop Time: 1400  Time Calculation (min): 20 min  Total Timed Code Minutes- PT: 20 minute(s)    No Treatment for Today secondary to elevated blood pressure    Therapy Charges for Today     Code Description Service Date Service Provider Modifiers Qty    47341226182 HC PT THER SUPP EA 15 MIN 10/3/2018 Bere Barrett PTA GP 1                    Bere Barrett PTA  10/3/2018

## 2018-10-09 ENCOUNTER — HOSPITAL ENCOUNTER (OUTPATIENT)
Dept: PHYSICAL THERAPY | Facility: HOSPITAL | Age: 38
Setting detail: THERAPIES SERIES
Discharge: HOME | End: 2018-10-09

## 2018-10-09 DIAGNOSIS — M25.571 RIGHT ANKLE PAIN, UNSPECIFIED CHRONICITY: Primary | ICD-10-CM

## 2018-10-09 DIAGNOSIS — M25.551 PAIN OF RIGHT HIP JOINT: ICD-10-CM

## 2018-10-09 DIAGNOSIS — S32.401B: ICD-10-CM

## 2018-10-09 PROCEDURE — 97110 THERAPEUTIC EXERCISES: CPT | Performed by: PHYSICAL THERAPIST

## 2018-10-09 NOTE — THERAPY DISCHARGE NOTE
Outpatient Physical Therapy Ortho Discharge  [facility]     Patient Name: Bogdan Thompson  : 1980  MRN: [id number]  Today's Date: 10/9/2018      Visit Date: 10/09/2018  Visits: 10/17  Insurance Visits Approved: 18  Recert Due: 10/09/2018 (discharged today)  MD Appt: Oct 24  Pain: pretreatment 0/10; post treatment 0/10  Improvement: pt is subjectively reporting 90% improvement since initial evaluation  There is no problem list on file for this patient.       Past Medical History:   Diagnosis Date   • Anxiety    • Hypertension    • Insomnia         Past Surgical History:   Procedure Laterality Date   • HIP FRACTURE SURGERY Right 2017         Visit Dx:     ICD-10-CM ICD-9-CM   1. Right ankle pain, unspecified chronicity M25.571 719.47   2. Open displaced fracture of right acetabulum, unspecified portion of acetabulum, initial encounter (CMS/[facility]) S32.401B 808.1   3. Pain of right hip joint M25.551 719.45                 PT Ortho     Row Name 10/09/18 1600       Subjective Comments    Subjective Comments pt states he is 90% better since starting PT  -BS       Precautions and Contraindications    Precautions WBAT  -BS       Subjective Pain    Able to rate subjective pain? yes  -BS    Pre-Treatment Pain Level 0  -BS    Post-Treatment Pain Level 0  -BS       Posture/Observations    Posture/Observations Comments BP after 10 minutes on the bike: 140/100   -BS      User Key  (r) = Recorded By, (t) = Taken By, (c) = Cosigned By    Initials Name Provider Type    Ian Henao, PT Physical Therapist                       Therapy Education  Given: HEP, Symptoms/condition management, Pain management  Program: Reinforced  How Provided: Verbal  Provided to: Patient  Level of Understanding: Verbalized          PT OP Goals     Row Name 10/09/18 1700 10/09/18 1600       PT Short Term Goals    STG Date to Achieve  -- 18  -BS    STG 1  -- I with HEP and have additions/changes by next recertification.  -BS     STG 1 Progress  -- Met  -BS    STG 2  -- R hip flexion 5/5.  -BS    STG 2 Progress  -- Met  -BS    STG 3  -- Improved R hip PROm abd >= 40°.  -BS    STG 3 Progress  -- Met  -BS    STG 4  -- Patient to be compliant with heel wedge wear.  -BS    STG 4 Progress  -- Met  -BS    STG 5  -- Patient to report the bottom of his foot is feeling better with no TTP in plantar fiscia.  -    STG 5 Progress  -- Met  -BS       Long Term Goals    LTG Date to Achieve  -- 09/21/18  -BS    LTG 1  -- AROM for R hip all WFL, no increase in pain.  -BS    LTG 1 Progress  -- Met  -BS    LTG 2  -- B LE 5/5, no increase in pain.  -BS    LTG 2 Progress  -- Met  -BS    LTG 3  -- patient able to ambulate for 10 minutes with no increase in pain.  -BS    LTG 3 Progress  -- Met  -BS    LTG 4  -- Patient able to ambulate up/down 3 steps reciprocally x10 reps with no increase in pain.  -BS    LTG 4 Progress  -- Met  -BS    LTG 5  -- I with final HEP.  -BS    LTG 5 Progress  -- Met  -BS    LTG 6  -- D/C with a final HEP and free 30 day fitness formula membership.  -    LTG 6 Progress  -- Met  -BS       Time Calculation    PT Goal Re-Cert Due Date --  -BS 10/09/18  -BS      User Key  (r) = Recorded By, (t) = Taken By, (c) = Cosigned By    Initials Name Provider Type    Ian Henao, PT Physical Therapist                PT Assessment/Plan     Row Name 10/09/18 1700          PT Assessment    Assessment Comments pt d/c'd due to all goals met, scored a perfect 80/80 on the Lower extremity functional scale, signifying no functional restrictions at this time. Slightly elevated BP at the start of the session.  -BS     Please refer to paper survey for additional self-reported information Yes  -BS     Rehab Potential Good  -BS     Patient/caregiver participated in establishment of treatment plan and goals Yes  -BS        PT Plan    PT Plan Comments d/c'd due to goals met  -BS       User Key  (r) = Recorded By, (t) = Taken By, (c) = Cosigned By     Initials Name Provider Type    Ian Henao, PT Physical Therapist                Exercises     Row Name 10/09/18 1600             Subjective Comments    Subjective Comments pt states he is 90% better since starting PT  -BS         Subjective Pain    Able to rate subjective pain? yes  -BS      Pre-Treatment Pain Level 0  -BS      Post-Treatment Pain Level 0  -BS         Exercise 1    Exercise Name 1 assess BP  -BS      Additional Comments 140/100 after riding stationary bike x 10 min  -BS         Exercise 2    Exercise Name 2 B St gastroc/soleus S  -BS      Reps 2 2  -BS      Time 2 30 sec hold  -BS         Exercise 3    Exercise Name 3 tennis ball rolls  -BS      Time 3 2 min  -BS         Exercise 4    Reps 4 20 reps ea  -BS      Time (Seconds) 4 4 way ankle w/ red TB: DF, PF, inv, castillo  -BS         Exercise 5    Time 5 3 mins  -BS      Additional Comments 7 cords  -BS      Time (Seconds) 5 shuttle leg press 2L  -BS         Exercise 6    Reps 6 3 mins  -BS      Additional Comments 6 cords  -BS      Time (Seconds) 6 shuttle leg press 1L  -BS         Exercise 7    Reps 7 20  -BS      Time (Seconds) 7 BOSU step fwd/lateral  -BS         Exercise 8    Exercise Name 8 wall squats  -BS      Reps 8 10  -BS      Time 8 10sec hold  -BS        User Key  (r) = Recorded By, (t) = Taken By, (c) = Cosigned By    Initials Name Provider Type    Ian Henao, PT Physical Therapist                       Outcome Measure Options: Lower Extremity Functional Scale (LEFS)  Lower Extremity Functional Index  Any of your usual work, housework or school activities: No difficulty  Your usual hobbies, recreational or sporting activities: No difficulty  Getting into or out of the bath: No difficulty  Walking between rooms: No difficulty  Putting on your shoes or socks: No difficulty  Squatting: No difficulty  Lifting an object, like a bag of groceries from the floor: No difficulty  Performing light activities around your home: No  difficulty  Performing heavy activities around your home: No difficulty  Getting into or out of a car: No difficulty  Walking 2 blocks: No difficulty  Walking a mile: No difficulty  Going up or down 10 stairs (about 1 flight of stairs): No difficulty  Standing for 1 hour: No difficulty  Sitting for 1 hour: No difficulty  Running on even ground: No difficulty  Running on uneven ground: No difficulty  Making sharp turns while running fast: No difficulty  Hopping: No difficulty  Rolling over in bed: No difficulty  Total: 80      Time Calculation:   Start Time: 1601  Stop Time: 1647  Time Calculation (min): 46 min  Total Timed Code Minutes- PT: 46 minute(s)  Therapy Suggested Charges     Code   Minutes Charges    None           Therapy Charges for Today     Code Description Service Date Service Provider Modifiers Qty    63932631358 HC PT THER PROC EA 15 MIN 10/9/2018 Ian Alvarez, PT GP 3          PT G-Codes  Outcome Measure Options: Lower Extremity Functional Scale (LEFS)  Total: 80              Ian Alvarez, PT  10/9/2018

## 2018-10-24 ENCOUNTER — OFFICE VISIT (OUTPATIENT)
Dept: FAMILY MEDICINE CLINIC | Facility: CLINIC | Age: 38
End: 2018-10-24

## 2018-10-24 VITALS
HEIGHT: 72 IN | WEIGHT: 127.25 LBS | DIASTOLIC BLOOD PRESSURE: 80 MMHG | HEART RATE: 68 BPM | OXYGEN SATURATION: 99 % | SYSTOLIC BLOOD PRESSURE: 128 MMHG | BODY MASS INDEX: 17.24 KG/M2

## 2018-10-24 DIAGNOSIS — Z98.890 HISTORY OF BACK SURGERY: ICD-10-CM

## 2018-10-24 DIAGNOSIS — M25.551 PAIN OF RIGHT HIP JOINT: Primary | ICD-10-CM

## 2018-10-24 DIAGNOSIS — G89.29 CHRONIC BILATERAL LOW BACK PAIN WITH RIGHT-SIDED SCIATICA: ICD-10-CM

## 2018-10-24 DIAGNOSIS — I10 ESSENTIAL HYPERTENSION: ICD-10-CM

## 2018-10-24 DIAGNOSIS — M54.41 CHRONIC BILATERAL LOW BACK PAIN WITH RIGHT-SIDED SCIATICA: ICD-10-CM

## 2018-10-24 PROCEDURE — 99214 OFFICE O/P EST MOD 30 MIN: CPT | Performed by: FAMILY MEDICINE

## 2018-10-24 RX ORDER — NAPROXEN 500 MG/1
500 TABLET ORAL 2 TIMES DAILY
Qty: 60 TABLET | Refills: 2 | Status: SHIPPED | OUTPATIENT
Start: 2018-10-24 | End: 2018-12-27 | Stop reason: SDUPTHER

## 2018-10-24 RX ORDER — OXYCODONE HYDROCHLORIDE AND ACETAMINOPHEN 5; 325 MG/1; MG/1
1 TABLET ORAL 4 TIMES DAILY PRN
Qty: 24 TABLET | Refills: 0 | Status: SHIPPED | OUTPATIENT
Start: 2018-10-24 | End: 2018-11-30 | Stop reason: SDUPTHER

## 2018-10-24 RX ORDER — OXYCODONE HYDROCHLORIDE AND ACETAMINOPHEN 5; 325 MG/1; MG/1
1 TABLET ORAL 4 TIMES DAILY PRN
Qty: 24 TABLET | Refills: 0 | Status: SHIPPED | OUTPATIENT
Start: 2018-10-24 | End: 2018-10-24 | Stop reason: SDUPTHER

## 2018-10-24 NOTE — PROGRESS NOTES
Subjective   Bogdan Thompson is a 38 y.o. male.   Cc: follow up  History of Present Illness The patient comes in for continued pain in his right hip. He has had Surgery on the hip about a year ago. He is still having pain in the leg.    The following portions of the patient's history were reviewed and updated as appropriate: allergies, current medications, past family history, past medical history, past social history, past surgical history and problem list.    Review of Systems   Constitutional: Negative for fatigue and fever.   Respiratory: Negative for cough, chest tightness and stridor.    Cardiovascular: Negative for chest pain and palpitations.   Musculoskeletal: Positive for arthralgias, back pain and gait problem.       Objective   Physical Exam   Constitutional: He appears well-developed and well-nourished.   HENT:   Head: Normocephalic and atraumatic.   Right Ear: External ear normal.   Left Ear: External ear normal.   Nose: Nose normal.   Mouth/Throat: Oropharynx is clear and moist.   Eyes: Pupils are equal, round, and reactive to light.   Neck: Normal range of motion.   Cardiovascular: Normal rate, regular rhythm and normal heart sounds.  Exam reveals no gallop and no friction rub.    No murmur heard.  Pulmonary/Chest: Effort normal and breath sounds normal. No respiratory distress. He has no wheezes. He has no rales.   Abdominal: Soft. Bowel sounds are normal. He exhibits no distension. There is no tenderness.   Musculoskeletal:   Tender in the right hip. It is worse on internally and externally rotating the right hip. The low back is moderately tender.   Vitals reviewed.        Assessment/Plan   Bogdan was seen today for follow-up and hip pain.    Diagnoses and all orders for this visit:    Pain of right hip joint  -     Ambulatory Referral to Pain Management    Chronic bilateral low back pain with right-sided sciatica  -     Ambulatory Referral to Pain Management    History of back surgery  -     Ambulatory  Referral to Pain Management    Essential hypertension    Other orders  -     naproxen (NAPROSYN) 500 MG tablet; Take 1 tablet by mouth 2 (Two) Times a Day.  -     Discontinue: oxyCODONE-acetaminophen (ROXICET) 5-325 MG per tablet; Take 1 tablet by mouth 4 (Four) Times a Day As Needed for Severe Pain .  -     oxyCODONE-acetaminophen (ROXICET) 5-325 MG per tablet; Take 1 tablet by mouth 4 (Four) Times a Day As Needed for Severe Pain .      Return to the clinic in 1 month/s.  Will contact with results as needed.  Have made a referral to pain management.

## 2018-11-28 ENCOUNTER — TELEPHONE (OUTPATIENT)
Dept: FAMILY MEDICINE CLINIC | Facility: CLINIC | Age: 38
End: 2018-11-28

## 2018-11-30 ENCOUNTER — OFFICE VISIT (OUTPATIENT)
Dept: FAMILY MEDICINE CLINIC | Facility: CLINIC | Age: 38
End: 2018-11-30

## 2018-11-30 VITALS
WEIGHT: 137.25 LBS | SYSTOLIC BLOOD PRESSURE: 138 MMHG | OXYGEN SATURATION: 97 % | BODY MASS INDEX: 18.59 KG/M2 | HEART RATE: 94 BPM | DIASTOLIC BLOOD PRESSURE: 80 MMHG | HEIGHT: 72 IN

## 2018-11-30 DIAGNOSIS — G89.29 CHRONIC BILATERAL LOW BACK PAIN WITH RIGHT-SIDED SCIATICA: ICD-10-CM

## 2018-11-30 DIAGNOSIS — M54.41 CHRONIC BILATERAL LOW BACK PAIN WITH RIGHT-SIDED SCIATICA: ICD-10-CM

## 2018-11-30 DIAGNOSIS — I10 ESSENTIAL HYPERTENSION: Primary | ICD-10-CM

## 2018-11-30 DIAGNOSIS — M25.551 PAIN OF RIGHT HIP JOINT: ICD-10-CM

## 2018-11-30 PROCEDURE — 99214 OFFICE O/P EST MOD 30 MIN: CPT | Performed by: FAMILY MEDICINE

## 2018-11-30 RX ORDER — BUSPIRONE HYDROCHLORIDE 10 MG/1
TABLET ORAL
Refills: 2 | COMMUNITY
Start: 2018-11-20 | End: 2018-12-27 | Stop reason: SDUPTHER

## 2018-11-30 RX ORDER — BENZTROPINE MESYLATE 1 MG/1
TABLET ORAL
Refills: 2 | COMMUNITY
Start: 2018-11-20 | End: 2019-02-11 | Stop reason: SDUPTHER

## 2018-11-30 RX ORDER — ARIPIPRAZOLE 5 MG/1
TABLET ORAL
Refills: 2 | COMMUNITY
Start: 2018-11-20 | End: 2018-12-27 | Stop reason: SDUPTHER

## 2018-11-30 RX ORDER — SILDENAFIL 100 MG/1
100 TABLET, FILM COATED ORAL DAILY PRN
Qty: 10 TABLET | Refills: 2 | Status: SHIPPED | OUTPATIENT
Start: 2018-11-30 | End: 2019-01-02 | Stop reason: SDUPTHER

## 2018-11-30 RX ORDER — OXYCODONE HYDROCHLORIDE AND ACETAMINOPHEN 5; 325 MG/1; MG/1
1 TABLET ORAL 4 TIMES DAILY PRN
Qty: 90 TABLET | Refills: 0 | Status: SHIPPED | OUTPATIENT
Start: 2018-11-30 | End: 2018-12-27 | Stop reason: SDUPTHER

## 2018-11-30 NOTE — PROGRESS NOTES
Subjective   Bogdan Thompson is a 38 y.o. male.   Cc: follow up  History of Present Illness The patient comes in for check of their chronic medical issues which include Chronic pain, Hypertension and Anxiety.He is at his baseline.His back continues to hurt..       The following portions of the patient's history were reviewed and updated as appropriate: allergies, current medications, past family history, past medical history, past social history, past surgical history and problem list.    Review of Systems   Constitutional: Negative for fatigue and fever.   Respiratory: Negative for cough, chest tightness and stridor.    Cardiovascular: Negative for chest pain, palpitations and leg swelling.   Musculoskeletal: Positive for arthralgias, back pain and myalgias.       Objective   Physical Exam   Constitutional: He appears well-developed and well-nourished.   HENT:   Head: Normocephalic and atraumatic.   Right Ear: External ear normal.   Left Ear: External ear normal.   Nose: Nose normal.   Mouth/Throat: Oropharynx is clear and moist.   Eyes: Pupils are equal, round, and reactive to light.   Neck: Normal range of motion.   Cardiovascular: Normal rate, regular rhythm and normal heart sounds. Exam reveals no gallop and no friction rub.   No murmur heard.  Pulmonary/Chest: Effort normal and breath sounds normal. No stridor. No respiratory distress. He has no wheezes.   Musculoskeletal:   Tender in the low back.   Vitals reviewed.        Assessment/Plan   Bogdan was seen today for follow-up and med refill.    Diagnoses and all orders for this visit:    Essential hypertension  -     Comprehensive metabolic panel; Future  -     CBC w AUTO Differential; Future    Pain of right hip joint  -     ToxASSURE Select 13 (MW) - Urine, Clean Catch    Chronic bilateral low back pain with right-sided sciatica  -     ToxASSURE Select 13 (MW) - Urine, Clean Catch    Other orders  -     oxyCODONE-acetaminophen (ROXICET) 5-325 MG per tablet; Take  1 tablet by mouth 4 (Four) Times a Day As Needed for Severe Pain .  -     sildenafil (VIAGRA) 100 MG tablet; Take 1 tablet by mouth Daily As Needed for erectile dysfunction.    GRAHAM [id number]  Return to the clinic in 1 month/s.  Will contact with results as needed.

## 2018-12-27 ENCOUNTER — LAB (OUTPATIENT)
Dept: LAB | Facility: HOSPITAL | Age: 38
End: 2018-12-27

## 2018-12-27 ENCOUNTER — OFFICE VISIT (OUTPATIENT)
Dept: FAMILY MEDICINE CLINIC | Facility: CLINIC | Age: 38
End: 2018-12-27

## 2018-12-27 VITALS
OXYGEN SATURATION: 97 % | HEART RATE: 85 BPM | SYSTOLIC BLOOD PRESSURE: 134 MMHG | WEIGHT: 129.06 LBS | BODY MASS INDEX: 17.48 KG/M2 | DIASTOLIC BLOOD PRESSURE: 80 MMHG | HEIGHT: 72 IN

## 2018-12-27 DIAGNOSIS — I10 ESSENTIAL HYPERTENSION: ICD-10-CM

## 2018-12-27 DIAGNOSIS — M54.41 CHRONIC BILATERAL LOW BACK PAIN WITH RIGHT-SIDED SCIATICA: Primary | ICD-10-CM

## 2018-12-27 DIAGNOSIS — F41.1 GENERALIZED ANXIETY DISORDER: ICD-10-CM

## 2018-12-27 DIAGNOSIS — G89.29 CHRONIC BILATERAL LOW BACK PAIN WITH RIGHT-SIDED SCIATICA: Primary | ICD-10-CM

## 2018-12-27 LAB
ARTICHOKE IGE QN: 117 MG/DL (ref 1–129)
BASOPHILS # BLD AUTO: 0.02 10*3/MM3 (ref 0–0.2)
BASOPHILS NFR BLD AUTO: 0.3 % (ref 0–2)
DEPRECATED RDW RBC AUTO: 44.9 FL (ref 35.1–43.9)
EOSINOPHIL # BLD AUTO: 0.23 10*3/MM3 (ref 0–0.7)
EOSINOPHIL NFR BLD AUTO: 3.2 % (ref 0–7)
ERYTHROCYTE [DISTWIDTH] IN BLOOD BY AUTOMATED COUNT: 13.3 % (ref 11.5–14.5)
HCT VFR BLD AUTO: 44.3 % (ref 39–49)
HGB BLD-MCNC: 15.6 G/DL (ref 13.7–17.3)
IMM GRANULOCYTES # BLD AUTO: 0.01 10*3/MM3 (ref 0–0.02)
IMM GRANULOCYTES NFR BLD AUTO: 0.1 % (ref 0–0.5)
LYMPHOCYTES # BLD AUTO: 1.41 10*3/MM3 (ref 0.6–4.2)
LYMPHOCYTES NFR BLD AUTO: 19.7 % (ref 10–50)
MCH RBC QN AUTO: 32.6 PG (ref 26.5–34)
MCHC RBC AUTO-ENTMCNC: 35.2 G/DL (ref 31.5–36.3)
MCV RBC AUTO: 92.5 FL (ref 80–98)
MONOCYTES # BLD AUTO: 0.78 10*3/MM3 (ref 0–0.9)
MONOCYTES NFR BLD AUTO: 10.9 % (ref 0–12)
NEUTROPHILS # BLD AUTO: 4.72 10*3/MM3 (ref 2–8.6)
NEUTROPHILS NFR BLD AUTO: 65.8 % (ref 37–80)
PLATELET # BLD AUTO: 171 10*3/MM3 (ref 150–450)
PMV BLD AUTO: 10.6 FL (ref 8–12)
RBC # BLD AUTO: 4.79 10*6/MM3 (ref 4.37–5.74)
WBC NRBC COR # BLD: 7.17 10*3/MM3 (ref 3.2–9.8)

## 2018-12-27 PROCEDURE — 83721 ASSAY OF BLOOD LIPOPROTEIN: CPT

## 2018-12-27 PROCEDURE — 80307 DRUG TEST PRSMV CHEM ANLYZR: CPT | Performed by: FAMILY MEDICINE

## 2018-12-27 PROCEDURE — 80053 COMPREHEN METABOLIC PANEL: CPT | Performed by: FAMILY MEDICINE

## 2018-12-27 PROCEDURE — 99214 OFFICE O/P EST MOD 30 MIN: CPT | Performed by: FAMILY MEDICINE

## 2018-12-27 PROCEDURE — G0481 DRUG TEST DEF 8-14 CLASSES: HCPCS | Performed by: FAMILY MEDICINE

## 2018-12-27 PROCEDURE — 85025 COMPLETE CBC W/AUTO DIFF WBC: CPT

## 2018-12-27 RX ORDER — HALOPERIDOL 5 MG/1
5 TABLET ORAL 2 TIMES DAILY
Qty: 60 TABLET | Refills: 1 | Status: SHIPPED | OUTPATIENT
Start: 2018-12-27 | End: 2019-02-11

## 2018-12-27 RX ORDER — OXYCODONE HYDROCHLORIDE AND ACETAMINOPHEN 5; 325 MG/1; MG/1
1 TABLET ORAL 4 TIMES DAILY PRN
Qty: 90 TABLET | Refills: 0 | Status: SHIPPED | OUTPATIENT
Start: 2018-12-27 | End: 2019-02-11 | Stop reason: SDUPTHER

## 2018-12-27 RX ORDER — ARIPIPRAZOLE 5 MG/1
5 TABLET ORAL NIGHTLY
Qty: 30 TABLET | Refills: 2 | Status: SHIPPED | OUTPATIENT
Start: 2018-12-27 | End: 2019-02-11

## 2018-12-27 RX ORDER — AMLODIPINE BESYLATE 2.5 MG/1
2.5 TABLET ORAL DAILY
Qty: 30 TABLET | Refills: 11 | Status: SHIPPED | OUTPATIENT
Start: 2018-12-27 | End: 2019-02-11 | Stop reason: SDUPTHER

## 2018-12-27 RX ORDER — TRAZODONE HYDROCHLORIDE 100 MG/1
100 TABLET ORAL NIGHTLY
Qty: 30 TABLET | Refills: 2 | Status: SHIPPED | OUTPATIENT
Start: 2018-12-27 | End: 2019-03-01 | Stop reason: SINTOL

## 2018-12-27 RX ORDER — QUETIAPINE FUMARATE 400 MG/1
400 TABLET, FILM COATED ORAL DAILY
Qty: 30 TABLET | Refills: 0 | Status: SHIPPED | OUTPATIENT
Start: 2018-12-27 | End: 2019-02-11

## 2018-12-27 RX ORDER — BUSPIRONE HYDROCHLORIDE 10 MG/1
10 TABLET ORAL EVERY MORNING
Qty: 30 TABLET | Refills: 2 | Status: SHIPPED | OUTPATIENT
Start: 2018-12-27 | End: 2019-02-11 | Stop reason: SDUPTHER

## 2018-12-27 RX ORDER — NAPROXEN 500 MG/1
500 TABLET ORAL 2 TIMES DAILY
Qty: 60 TABLET | Refills: 2 | Status: SHIPPED | OUTPATIENT
Start: 2018-12-27 | End: 2020-08-13

## 2018-12-27 NOTE — PROGRESS NOTES
Subjective   Bogdan Thompson is a 38 y.o. male.    cc: follow up  History of Present Illness The patient comes in for check of their chronic medical issues which include Hypertension,Chronic back pain and anxiety. His back is hurting quite a bit.  .       The following portions of the patient's history were reviewed and updated as appropriate: allergies, current medications, past family history, past medical history, past social history, past surgical history and problem list.    Review of Systems   Constitutional: Negative for fatigue and fever.   Respiratory: Negative for cough, chest tightness and stridor.    Cardiovascular: Negative for chest pain, palpitations and leg swelling.       Objective   Physical Exam   Constitutional: He appears well-developed and well-nourished.   HENT:   Head: Normocephalic and atraumatic.   Right Ear: External ear normal.   Left Ear: External ear normal.   Nose: Nose normal.   Mouth/Throat: Oropharynx is clear and moist.   Eyes: Conjunctivae are normal. Pupils are equal, round, and reactive to light.   Neck: Normal range of motion.   Cardiovascular: Normal rate, regular rhythm and normal heart sounds. Exam reveals no gallop and no friction rub.   No murmur heard.  Pulmonary/Chest: Effort normal and breath sounds normal. No stridor. No respiratory distress. He has no wheezes. He has no rales.   Abdominal: Soft. Bowel sounds are normal. He exhibits no distension. There is no tenderness. There is no guarding.   Musculoskeletal:   He has low back pain present.   Skin: Skin is warm and dry.   Vitals reviewed.        Assessment/Plan   Bogdan was seen today for follow-up.    Diagnoses and all orders for this visit:    Chronic bilateral low back pain with right-sided sciatica    Essential hypertension  -     amLODIPine (NORVASC) 2.5 MG tablet; Take 1 tablet by mouth Daily.    Generalized anxiety disorder  -     QUEtiapine (SEROquel) 400 MG tablet; Take 1 tablet by mouth Daily.    Other orders  -      naproxen (NAPROSYN) 500 MG tablet; Take 1 tablet by mouth 2 (Two) Times a Day.  -     haloperidol (HALDOL) 5 MG tablet; Take 1 tablet by mouth 2 (Two) Times a Day.  -     ARIPiprazole (ABILIFY) 5 MG tablet; Take 1 tablet by mouth Every Night.  -     busPIRone (BUSPAR) 10 MG tablet; Take 1 tablet by mouth Every Morning.  -     traZODone (DESYREL) 100 MG tablet; Take 1 tablet by mouth Every Night.  -     oxyCODONE-acetaminophen (ROXICET) 5-325 MG per tablet; Take 1 tablet by mouth 4 (Four) Times a Day As Needed for Severe Pain .      Heidy is obtained and is appropriate-[id number]  Return to the clinic in 1 month/s.  Will contact with results as needed.  He is to get a Toxassure and other labs today. If he doesn't, will be looking for other means to treat his back pain.

## 2018-12-28 ENCOUNTER — TELEPHONE (OUTPATIENT)
Dept: FAMILY MEDICINE CLINIC | Facility: CLINIC | Age: 38
End: 2018-12-28

## 2018-12-28 NOTE — TELEPHONE ENCOUNTER
Pt called this morning stating that his pharmacy wouldn't give him the oxycontin.  They said it needed a prior authorization or something.  He wants to know what needs to be done.  Please call and advise.     [facility] went to [facility] [place].  His number is [phone].

## 2019-01-01 LAB — CONV REPORT SUMMARY: NORMAL

## 2019-01-02 RX ORDER — SILDENAFIL 100 MG/1
TABLET, FILM COATED ORAL
Qty: 10 TABLET | Refills: 0 | Status: SHIPPED | OUTPATIENT
Start: 2019-01-02 | End: 2019-01-08 | Stop reason: SDUPTHER

## 2019-01-07 ENCOUNTER — TELEPHONE (OUTPATIENT)
Dept: FAMILY MEDICINE CLINIC | Facility: CLINIC | Age: 39
End: 2019-01-07

## 2019-01-07 NOTE — TELEPHONE ENCOUNTER
Pt called wanting to know if Dr. Campos had sent in a PA on his Oxycodone. Said he had to pay for a script out of pocket last week. Pharmacy was supposed to send a PA over. Please call pt today if possible with information. Thanks!

## 2019-01-08 RX ORDER — SILDENAFIL 100 MG/1
100 TABLET, FILM COATED ORAL SEE ADMIN INSTRUCTIONS
Qty: 10 TABLET | Refills: 0 | Status: SHIPPED | OUTPATIENT
Start: 2019-01-08 | End: 2019-02-15 | Stop reason: SDUPTHER

## 2019-01-09 ENCOUNTER — DOCUMENTATION (OUTPATIENT)
Dept: FAMILY MEDICINE CLINIC | Facility: CLINIC | Age: 39
End: 2019-01-09

## 2019-01-09 NOTE — PROGRESS NOTES
The patient had a Toxassure that had a Codeine level of 7228. No evidence of Oxycodone present or evidence of metabolites of Codeine. Will check this out and retest next visit.

## 2019-01-11 DIAGNOSIS — E87.6 LOW BLOOD POTASSIUM: Primary | ICD-10-CM

## 2019-02-11 ENCOUNTER — LAB (OUTPATIENT)
Dept: LAB | Facility: HOSPITAL | Age: 39
End: 2019-02-11

## 2019-02-11 ENCOUNTER — OFFICE VISIT (OUTPATIENT)
Dept: FAMILY MEDICINE CLINIC | Facility: CLINIC | Age: 39
End: 2019-02-11

## 2019-02-11 VITALS
HEIGHT: 72 IN | SYSTOLIC BLOOD PRESSURE: 140 MMHG | DIASTOLIC BLOOD PRESSURE: 80 MMHG | OXYGEN SATURATION: 100 % | BODY MASS INDEX: 18.28 KG/M2 | WEIGHT: 135 LBS | HEART RATE: 96 BPM

## 2019-02-11 DIAGNOSIS — M54.41 CHRONIC BILATERAL LOW BACK PAIN WITH RIGHT-SIDED SCIATICA: Primary | ICD-10-CM

## 2019-02-11 DIAGNOSIS — G89.29 CHRONIC BILATERAL LOW BACK PAIN WITH RIGHT-SIDED SCIATICA: Primary | ICD-10-CM

## 2019-02-11 DIAGNOSIS — F41.9 ANXIETY: ICD-10-CM

## 2019-02-11 DIAGNOSIS — E87.6 LOW BLOOD POTASSIUM: ICD-10-CM

## 2019-02-11 DIAGNOSIS — I10 ESSENTIAL HYPERTENSION: ICD-10-CM

## 2019-02-11 DIAGNOSIS — G47.00 INSOMNIA, UNSPECIFIED TYPE: ICD-10-CM

## 2019-02-11 LAB
ANION GAP SERPL CALCULATED.3IONS-SCNC: 13 MMOL/L (ref 5–15)
BUN BLD-MCNC: 12 MG/DL (ref 7–21)
BUN/CREAT SERPL: 13 (ref 7–25)
CALCIUM SPEC-SCNC: 9.6 MG/DL (ref 8.4–10.2)
CHLORIDE SERPL-SCNC: 99 MMOL/L (ref 95–110)
CO2 SERPL-SCNC: 27 MMOL/L (ref 22–31)
CREAT BLD-MCNC: 0.92 MG/DL (ref 0.7–1.3)
GFR SERPL CREATININE-BSD FRML MDRD: 110 ML/MIN/1.73 (ref 63–147)
GLUCOSE BLD-MCNC: 168 MG/DL (ref 60–100)
POTASSIUM BLD-SCNC: 3.3 MMOL/L (ref 3.5–5.1)
SODIUM BLD-SCNC: 139 MMOL/L (ref 137–145)

## 2019-02-11 PROCEDURE — 80048 BASIC METABOLIC PNL TOTAL CA: CPT

## 2019-02-11 PROCEDURE — 99214 OFFICE O/P EST MOD 30 MIN: CPT | Performed by: FAMILY MEDICINE

## 2019-02-11 PROCEDURE — 36415 COLL VENOUS BLD VENIPUNCTURE: CPT

## 2019-02-11 RX ORDER — ARIPIPRAZOLE 5 MG/1
5 TABLET ORAL NIGHTLY
Qty: 30 TABLET | Refills: 2 | Status: CANCELLED | OUTPATIENT
Start: 2019-02-11

## 2019-02-11 RX ORDER — BUSPIRONE HYDROCHLORIDE 10 MG/1
10 TABLET ORAL EVERY MORNING
Qty: 30 TABLET | Refills: 2 | Status: SHIPPED | OUTPATIENT
Start: 2019-02-11 | End: 2019-09-17 | Stop reason: SDUPTHER

## 2019-02-11 RX ORDER — AMLODIPINE BESYLATE 2.5 MG/1
2.5 TABLET ORAL DAILY
Qty: 30 TABLET | Refills: 11 | Status: SHIPPED | OUTPATIENT
Start: 2019-02-11 | End: 2019-06-24 | Stop reason: DRUGHIGH

## 2019-02-11 RX ORDER — HALOPERIDOL 5 MG/1
5 TABLET ORAL 2 TIMES DAILY
Qty: 60 TABLET | Refills: 2 | Status: CANCELLED | OUTPATIENT
Start: 2019-02-11

## 2019-02-11 RX ORDER — QUETIAPINE FUMARATE 25 MG/1
25 TABLET, FILM COATED ORAL NIGHTLY
Qty: 30 TABLET | Refills: 2 | Status: SHIPPED | OUTPATIENT
Start: 2019-02-11 | End: 2019-09-17 | Stop reason: SDUPTHER

## 2019-02-11 RX ORDER — POTASSIUM CHLORIDE 20 MEQ/1
20 TABLET, EXTENDED RELEASE ORAL DAILY
Qty: 30 TABLET | Refills: 1 | Status: SHIPPED | OUTPATIENT
Start: 2019-02-11 | End: 2019-02-15 | Stop reason: SDUPTHER

## 2019-02-11 RX ORDER — OXYCODONE HYDROCHLORIDE AND ACETAMINOPHEN 5; 325 MG/1; MG/1
1 TABLET ORAL 4 TIMES DAILY PRN
Qty: 90 TABLET | Refills: 0 | Status: CANCELLED | OUTPATIENT
Start: 2019-02-11

## 2019-02-11 RX ORDER — OXYCODONE HYDROCHLORIDE AND ACETAMINOPHEN 5; 325 MG/1; MG/1
1 TABLET ORAL 4 TIMES DAILY PRN
Qty: 120 TABLET | Refills: 0 | Status: SHIPPED | OUTPATIENT
Start: 2019-02-11 | End: 2019-03-01 | Stop reason: SDUPTHER

## 2019-02-11 RX ORDER — BENZTROPINE MESYLATE 1 MG/1
1 TABLET ORAL DAILY
Qty: 30 TABLET | Refills: 2 | Status: SHIPPED | OUTPATIENT
Start: 2019-02-11 | End: 2019-09-17 | Stop reason: SDUPTHER

## 2019-02-11 NOTE — PROGRESS NOTES
Subjective   Bogdan Thompson is a 39 y.o. male.    cc:follow up  History of Present Illness The patient comes in for check of their chronic medical issues which include Hypertension,Anxiety and Insomnia. He also has chronic back pain. He is at his base line. He is wanting to get back to a low dose on his Seroquel as he feels that the other medications are zonking him .       The following portions of the patient's history were reviewed and updated as appropriate: allergies, current medications, past family history, past medical history, past social history, past surgical history and problem list.    Review of Systems   Constitutional: Negative for fatigue and fever.   Respiratory: Negative for cough, chest tightness and stridor.    Cardiovascular: Negative for chest pain, palpitations and leg swelling.       Objective   Physical Exam   Constitutional: He appears well-developed and well-nourished.   HENT:   Head: Normocephalic and atraumatic.   Right Ear: External ear normal.   Left Ear: External ear normal.   Nose: Nose normal.   Mouth/Throat: Oropharynx is clear and moist.   Eyes: Pupils are equal, round, and reactive to light.   Neck: Normal range of motion.   Cardiovascular: Normal rate, regular rhythm and normal heart sounds. Exam reveals no gallop and no friction rub.   No murmur heard.  Pulmonary/Chest: Effort normal and breath sounds normal. No stridor. No respiratory distress. He has no wheezes. He has no rales.   Abdominal: Soft. Bowel sounds are normal. He exhibits no distension. There is no tenderness. There is no guarding.   Musculoskeletal:   The low back is tender to palpation.   Skin: Skin is warm and dry.   Vitals reviewed.        Assessment/Plan   Bogdan was seen today for follow-up and abnormal lab.    Diagnoses and all orders for this visit:    Chronic bilateral low back pain with right-sided sciatica    Essential hypertension  -     amLODIPine (NORVASC) 2.5 MG tablet; Take 1 tablet by mouth  Daily.    Anxiety    Insomnia, unspecified type    Other orders  -     Cancel: haloperidol (HALDOL) 5 MG tablet; Take 1 tablet by mouth 2 (Two) Times a Day.  -     busPIRone (BUSPAR) 10 MG tablet; Take 1 tablet by mouth Every Morning.  -     benztropine (COGENTIN) 1 MG tablet; Take 1 tablet by mouth Daily.  -     Cancel: ARIPiprazole (ABILIFY) 5 MG tablet; Take 1 tablet by mouth Every Night.  -     Cancel: oxyCODONE-acetaminophen (ROXICET) 5-325 MG per tablet; Take 1 tablet by mouth 4 (Four) Times a Day As Needed for Severe Pain .  -     QUEtiapine (SEROQUEL) 25 MG tablet; Take 1 tablet by mouth Every Night.  -     oxyCODONE-acetaminophen (ROXICET) 5-325 MG per tablet; Take 1 tablet by mouth 4 (Four) Times a Day As Needed for Severe Pain .        Return to the clinic in 3 month/s.  Will contact with results as needed.  BMP is pending.   The patient has a Pain Management appointment on March 18,2019 and they will be assuming his pain management.  GRAHAM is appropriate - [phone]

## 2019-02-15 ENCOUNTER — TELEPHONE (OUTPATIENT)
Dept: FAMILY MEDICINE CLINIC | Facility: CLINIC | Age: 39
End: 2019-02-15

## 2019-02-15 DIAGNOSIS — E87.6 LOW SERUM POTASSIUM: Primary | ICD-10-CM

## 2019-02-15 RX ORDER — SILDENAFIL 100 MG/1
100 TABLET, FILM COATED ORAL SEE ADMIN INSTRUCTIONS
Qty: 10 TABLET | Refills: 0 | Status: SHIPPED | OUTPATIENT
Start: 2019-02-15 | End: 2019-02-22 | Stop reason: SDUPTHER

## 2019-02-15 RX ORDER — POTASSIUM CHLORIDE 20 MEQ/1
20 TABLET, EXTENDED RELEASE ORAL DAILY
Qty: 30 TABLET | Refills: 1 | Status: SHIPPED | OUTPATIENT
Start: 2019-02-15 | End: 2019-04-05 | Stop reason: SDUPTHER

## 2019-02-15 NOTE — TELEPHONE ENCOUNTER
Order Placed, [facility] Called and advised that the lab order has been placed.      Thompson has been called.

## 2019-02-15 NOTE — TELEPHONE ENCOUNTER
GINGER KUO HAS RECEIVED A LETTER FROM THE OFFICE HERE THAT HIS POTASSIUM WAS LOW AND HE NEEDS TO GET A REPEAT LAB ON THIS..he IS WANTING TO HAVE THIS DONE AT THE [facility]..PLEASE PUT IN ORDERS .he KNOWS DR CHAN IS OUT OF OFFICE NOW AND WILL BE BACK NEXT WK  CALL HIM  [phone]

## 2019-02-22 RX ORDER — SILDENAFIL 100 MG/1
TABLET, FILM COATED ORAL
Qty: 10 TABLET | Refills: 0 | Status: SHIPPED | OUTPATIENT
Start: 2019-02-22 | End: 2019-03-05 | Stop reason: SDUPTHER

## 2019-03-01 ENCOUNTER — OFFICE VISIT (OUTPATIENT)
Dept: FAMILY MEDICINE CLINIC | Facility: CLINIC | Age: 39
End: 2019-03-01

## 2019-03-01 VITALS
OXYGEN SATURATION: 99 % | BODY MASS INDEX: 18.76 KG/M2 | SYSTOLIC BLOOD PRESSURE: 156 MMHG | HEIGHT: 72 IN | DIASTOLIC BLOOD PRESSURE: 82 MMHG | WEIGHT: 138.5 LBS | HEART RATE: 84 BPM

## 2019-03-01 DIAGNOSIS — M54.41 CHRONIC BILATERAL LOW BACK PAIN WITH RIGHT-SIDED SCIATICA: ICD-10-CM

## 2019-03-01 DIAGNOSIS — I10 ESSENTIAL HYPERTENSION: Primary | ICD-10-CM

## 2019-03-01 DIAGNOSIS — G89.29 CHRONIC BILATERAL LOW BACK PAIN WITH RIGHT-SIDED SCIATICA: ICD-10-CM

## 2019-03-01 DIAGNOSIS — F41.9 ANXIETY: ICD-10-CM

## 2019-03-01 PROCEDURE — 99214 OFFICE O/P EST MOD 30 MIN: CPT | Performed by: FAMILY MEDICINE

## 2019-03-01 RX ORDER — OXYCODONE HYDROCHLORIDE AND ACETAMINOPHEN 5; 325 MG/1; MG/1
1 TABLET ORAL 4 TIMES DAILY PRN
Qty: 120 TABLET | Refills: 0 | Status: CANCELLED | OUTPATIENT
Start: 2019-03-01

## 2019-03-01 RX ORDER — OXYCODONE HYDROCHLORIDE AND ACETAMINOPHEN 5; 325 MG/1; MG/1
1 TABLET ORAL 4 TIMES DAILY PRN
Qty: 40 TABLET | Refills: 0 | Status: SHIPPED | OUTPATIENT
Start: 2019-03-01 | End: 2019-03-11 | Stop reason: SDUPTHER

## 2019-03-01 RX ORDER — ARIPIPRAZOLE 5 MG/1
TABLET ORAL
Refills: 2 | COMMUNITY
Start: 2019-02-18 | End: 2019-09-17

## 2019-03-01 RX ORDER — HALOPERIDOL 5 MG/1
5 TABLET ORAL 2 TIMES DAILY
Refills: 2 | COMMUNITY
Start: 2019-02-18 | End: 2019-09-17

## 2019-03-01 NOTE — PROGRESS NOTES
Subjective   Bogdan Thompson is a 39 y.o. male.    cc:follow up  History of Present Illness The patient comes in for check of their chronic medical issues which include Hypertension,Baack Pain and Anxiety.He is at his base line.His back is hurting in the low Lumbar area. He has finished his pain medication early..       The following portions of the patient's history were reviewed and updated as appropriate: allergies, current medications, past family history, past medical history, past social history, past surgical history and problem list.    Review of Systems   Constitutional: Negative for fatigue and fever.   Respiratory: Negative for cough, chest tightness and stridor.    Cardiovascular: Negative for chest pain, palpitations and leg swelling.       Objective   Physical Exam   Constitutional: He appears well-developed and well-nourished.   HENT:   Head: Normocephalic and atraumatic.   Right Ear: External ear normal.   Left Ear: External ear normal.   Nose: Nose normal.   Mouth/Throat: Oropharynx is clear and moist.   Eyes: Pupils are equal, round, and reactive to light.   Neck: Normal range of motion.   Cardiovascular: Normal rate, regular rhythm and normal heart sounds. Exam reveals no gallop and no friction rub.   No murmur heard.  Pulmonary/Chest: Effort normal and breath sounds normal. No stridor. No respiratory distress. He has no wheezes.   Abdominal: Soft. Bowel sounds are normal. He exhibits no distension. There is no tenderness. There is no guarding.   Musculoskeletal:   Back has tenderness in the lumbar area.   Skin: Skin is warm and dry.   Vitals reviewed.        Assessment/Plan   Bogdan was seen today for med refill, back pain and follow-up.    Diagnoses and all orders for this visit:    Essential hypertension    Chronic bilateral low back pain with right-sided sciatica    Anxiety    Other orders  -     oxyCODONE-acetaminophen (ROXICET) 5-325 MG per tablet; Take 1 tablet by mouth 4 (Four) Times a Day As  Needed for Severe Pain .    GRAHAM is appropriate [phone]  Have refilled enough to get him to the 11 th of March. At that point,will get his next prescription up to when he starts pain management.

## 2019-03-05 RX ORDER — SILDENAFIL 100 MG/1
TABLET, FILM COATED ORAL
Qty: 10 TABLET | Refills: 0 | Status: SHIPPED | OUTPATIENT
Start: 2019-03-05 | End: 2019-09-17 | Stop reason: SDUPTHER

## 2019-03-11 ENCOUNTER — TELEPHONE (OUTPATIENT)
Dept: FAMILY MEDICINE CLINIC | Facility: CLINIC | Age: 39
End: 2019-03-11

## 2019-03-11 RX ORDER — OXYCODONE HYDROCHLORIDE AND ACETAMINOPHEN 5; 325 MG/1; MG/1
1 TABLET ORAL 4 TIMES DAILY PRN
Qty: 120 TABLET | Refills: 0 | Status: SHIPPED | OUTPATIENT
Start: 2019-03-11 | End: 2020-03-13 | Stop reason: SDUPTHER

## 2019-03-11 NOTE — TELEPHONE ENCOUNTER
Patient called to get a refill on his oxyCODONE-acetaminophen (ROXICET) 5-325 MG per tablet. He wants this sent to [facility] in [place]

## 2019-03-11 NOTE — TELEPHONE ENCOUNTER
GINGER KUO HAS CALLED AGAIN TODAY TO GET HIS FULL PRESP OF NORCO TO BE SENT TO [facility]

## 2019-04-05 RX ORDER — POTASSIUM CHLORIDE 20 MEQ/1
TABLET, EXTENDED RELEASE ORAL
Qty: 30 TABLET | Refills: 1 | Status: SHIPPED | OUTPATIENT
Start: 2019-04-05 | End: 2019-05-30 | Stop reason: SDUPTHER

## 2019-05-30 RX ORDER — POTASSIUM CHLORIDE 20 MEQ/1
TABLET, EXTENDED RELEASE ORAL
Qty: 30 TABLET | Refills: 1 | Status: SHIPPED | OUTPATIENT
Start: 2019-05-30 | End: 2020-05-13 | Stop reason: SDUPTHER

## 2019-06-24 ENCOUNTER — OFFICE VISIT (OUTPATIENT)
Dept: FAMILY MEDICINE CLINIC | Facility: CLINIC | Age: 39
End: 2019-06-24

## 2019-06-24 VITALS
HEART RATE: 90 BPM | OXYGEN SATURATION: 99 % | DIASTOLIC BLOOD PRESSURE: 100 MMHG | WEIGHT: 131 LBS | SYSTOLIC BLOOD PRESSURE: 160 MMHG | BODY MASS INDEX: 18.34 KG/M2 | HEIGHT: 71 IN | TEMPERATURE: 98.7 F

## 2019-06-24 DIAGNOSIS — I10 ESSENTIAL HYPERTENSION: ICD-10-CM

## 2019-06-24 DIAGNOSIS — J40 BRONCHITIS: Primary | ICD-10-CM

## 2019-06-24 PROCEDURE — 99214 OFFICE O/P EST MOD 30 MIN: CPT | Performed by: NURSE PRACTITIONER

## 2019-06-24 RX ORDER — AMLODIPINE BESYLATE 5 MG/1
5 TABLET ORAL DAILY
Qty: 30 TABLET | Refills: 0 | Status: SHIPPED | OUTPATIENT
Start: 2019-06-24 | End: 2019-09-17 | Stop reason: SDUPTHER

## 2019-06-24 RX ORDER — ALBUTEROL SULFATE 2.5 MG/3ML
2.5 SOLUTION RESPIRATORY (INHALATION) ONCE
Status: COMPLETED | OUTPATIENT
Start: 2019-06-24 | End: 2019-06-24

## 2019-06-24 RX ORDER — BENZONATATE 100 MG/1
CAPSULE ORAL
Qty: 30 CAPSULE | Refills: 0 | Status: SHIPPED | OUTPATIENT
Start: 2019-06-24 | End: 2019-09-17

## 2019-06-24 RX ORDER — PREDNISONE 20 MG/1
TABLET ORAL
Qty: 10 TABLET | Refills: 0 | Status: SHIPPED | OUTPATIENT
Start: 2019-06-24 | End: 2019-09-17

## 2019-06-24 RX ORDER — ALBUTEROL SULFATE 90 UG/1
2 AEROSOL, METERED RESPIRATORY (INHALATION) EVERY 4 HOURS PRN
Qty: 18 G | Refills: 0 | Status: SHIPPED | OUTPATIENT
Start: 2019-06-24 | End: 2020-04-15

## 2019-06-24 RX ORDER — AZITHROMYCIN 250 MG/1
TABLET, FILM COATED ORAL
Qty: 6 TABLET | Refills: 0 | Status: SHIPPED | OUTPATIENT
Start: 2019-06-24 | End: 2019-09-17

## 2019-06-24 RX ADMIN — ALBUTEROL SULFATE 2.5 MG: 2.5 SOLUTION RESPIRATORY (INHALATION) at 11:53

## 2019-06-24 NOTE — PROGRESS NOTES
"Maricarmen Thompson is a 39 y.o. male.     [facility] Visit    PCP: was previously seeing Dr. Campos in [place], but lives in [place] and needs PCP locally    CC: \"I have a chronic cough x 3-5 days, chest burns at times\"    Currently seeing pain management and receiving Rx for Oxycodone.  Previously seen by [facility] in [place], but not seen in several months and out of meds for a few months.  Would like to get new referral to [facility] in [place] once he establishes with new PCP.      History of HTN and b/p running high today.  Needs refill of Amlodipine.  Reports that b/p still running high even on medications.  Most recently was only on 2.5 mg daily.      Denies history of asthma.  Is a smoker. 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       Cough   This is a new problem. The current episode started in the past 7 days. The problem has been gradually worsening. The problem occurs every few minutes. The cough is non-productive. Associated symptoms include chills, nasal congestion, rhinorrhea and shortness of breath (hard to take deep breath at times). Pertinent negatives include no chest pain ( burning only), ear congestion, ear pain, fever, headaches, heartburn, hemoptysis, myalgias, postnasal drip, rash, sore throat, sweats, weight loss or wheezing. The symptoms are aggravated by dust and fumes. Risk factors for lung disease include smoking/tobacco exposure. He has tried nothing for the symptoms. There is no history of asthma or COPD.        The following portions of the patient's history were reviewed and updated as appropriate: allergies, current medications, past medical history, past social history, past surgical history and problem list.    Review of Systems   Constitutional: Positive for chills and diaphoresis. Negative for appetite change, fatigue, fever and unexpected weight loss.   HENT: Positive for congestion ( mild) and rhinorrhea. " "Negative for ear discharge, ear pain, postnasal drip, sinus pressure, sneezing and sore throat.    Eyes: Negative for discharge and itching.   Respiratory: Positive for cough, chest tightness and shortness of breath (hard to take deep breath at times). Negative for hemoptysis and wheezing.    Cardiovascular: Negative for chest pain ( burning only), palpitations and leg swelling.   Gastrointestinal: Negative for diarrhea, nausea and vomiting.   Genitourinary: Negative for difficulty urinating.   Musculoskeletal: Negative for myalgias, neck pain and neck stiffness.   Skin: Negative for rash.   Neurological: Negative for dizziness and headache.     /100 (BP Location: Left arm, Patient Position: Sitting, Cuff Size: Adult)   Pulse 90   Temp 98.7 °F (37.1 °C) (Tympanic)   Ht 180.3 cm (71\")   Wt 59.4 kg (131 lb)   SpO2 99%   BMI 18.27 kg/m²     Objective   Physical Exam   Constitutional: He is oriented to person, place, and time. He appears well-developed and well-nourished. No distress.   HENT:   Head: Normocephalic and atraumatic.   Right Ear: Tympanic membrane and ear canal normal.   Left Ear: Tympanic membrane and ear canal normal.   Nose: Rhinorrhea ( ) and congestion ( mild) present. Right sinus exhibits no maxillary sinus tenderness and no frontal sinus tenderness. Left sinus exhibits no maxillary sinus tenderness and no frontal sinus tenderness.   Mouth/Throat: Uvula is midline, oropharynx is clear and moist and mucous membranes are normal.   Eyes: Conjunctivae are normal. Right eye exhibits no discharge. Left eye exhibits no discharge.   Neck: Neck supple.   Cardiovascular: Normal rate and regular rhythm.   Pulmonary/Chest: Effort normal. He has decreased breath sounds. He has wheezes. He has no rhonchi. He has no rales.   Tight, wheezy, congested cough.  Albuterol neb in office with improved aeration noted.    Lymphadenopathy:     He has no cervical adenopathy.   Neurological: He is alert and oriented " to person, place, and time.   Nursing note and vitals reviewed.    No results found for this or any previous visit (from the past 24 hour(s)).  No Images in the past 120 days found..      Assessment/Plan   Bogdan was seen today for cough and med refill.    Diagnoses and all orders for this visit:    Bronchitis  -     albuterol (PROVENTIL) nebulizer solution 0.083% 2.5 mg/3mL  -     Nebulizer Treatment  -     azithromycin (ZITHROMAX Z-KAYE) 250 MG tablet; Take 2 tablets the first day, then 1 tablet daily for 4 days.  -     predniSONE (DELTASONE) 20 MG tablet; 2 tabs po daily x 5 days  -     benzonatate (TESSALON PERLES) 100 MG capsule; 1-2 caps po TID prn cough  -     albuterol sulfate  (90 Base) MCG/ACT inhaler; Inhale 2 puffs Every 4 (Four) Hours As Needed for Wheezing or Shortness of Air.    Essential hypertension  -     amLODIPine (NORVASC) 5 MG tablet; Take 1 tablet by mouth Daily.      Push fluids  Rest  Rx for Zithromax, Prednisone, Tessalon perles, Ventolin HFA provided  Encouraged to wear mask while working  Smoking cessation encouraged    Restart Amlodipine and increase to 5 mg daily  Appt to establish with BREE Leon on 7-25-19

## 2019-06-24 NOTE — PATIENT INSTRUCTIONS
Acute Bronchitis, Adult  Acute bronchitis is when air tubes (bronchi) in the lungs suddenly get swollen. The condition can make it hard to breathe. It can also cause these symptoms:  · A cough.  · Coughing up clear, yellow, or green mucus.  · Wheezing.  · Chest congestion.  · Shortness of breath.  · A fever.  · Body aches.  · Chills.  · A sore throat.    Follow these instructions at home:  Medicines  · Take over-the-counter and prescription medicines only as told by your doctor.  · If you were prescribed an antibiotic medicine, take it as told by your doctor. Do not stop taking the antibiotic even if you start to feel better.  General instructions    · Rest.  · Drink enough fluids to keep your pee (urine) pale yellow.  · Avoid smoking and secondhand smoke. If you smoke and you need help quitting, ask your doctor. Quitting will help your lungs heal faster.  · Use an inhaler, cool mist vaporizer, or humidifier as told by your doctor.  · Keep all follow-up visits as told by your doctor. This is important.  How is this prevented?  To lower your risk of getting this condition again:  · Wash your hands often with soap and water. If you cannot use soap and water, use hand .  · Avoid contact with people who have cold symptoms.  · Try not to touch your hands to your mouth, nose, or eyes.  · Make sure to get the flu shot every year.    Contact a doctor if:  · Your symptoms do not get better in 2 weeks.  Get help right away if:  · You cough up blood.  · You have chest pain.  · You have very bad shortness of breath.  · You become dehydrated.  · You faint (pass out) or keep feeling like you are going to pass out.  · You keep throwing up (vomiting).  · You have a very bad headache.  · Your fever or chills gets worse.  This information is not intended to replace advice given to you by your health care provider. Make sure you discuss any questions you have with your health care provider.  Document Released: 06/05/2009  Document Revised: 08/01/2018 Document Reviewed: 06/07/2017  [employer] Interactive Patient Education © 2019 [employer]

## 2019-06-25 RX ORDER — HALOPERIDOL 5 MG/1
TABLET ORAL
Qty: 60 TABLET | Refills: 0 | OUTPATIENT
Start: 2019-06-25

## 2019-09-17 ENCOUNTER — OFFICE VISIT (OUTPATIENT)
Dept: FAMILY MEDICINE CLINIC | Facility: CLINIC | Age: 39
End: 2019-09-17

## 2019-09-17 VITALS
OXYGEN SATURATION: 98 % | HEART RATE: 97 BPM | WEIGHT: 128.44 LBS | SYSTOLIC BLOOD PRESSURE: 162 MMHG | DIASTOLIC BLOOD PRESSURE: 94 MMHG | HEIGHT: 71 IN | BODY MASS INDEX: 17.98 KG/M2

## 2019-09-17 DIAGNOSIS — R05.9 COUGH: ICD-10-CM

## 2019-09-17 DIAGNOSIS — F31.9 BIPOLAR AFFECTIVE DISORDER, REMISSION STATUS UNSPECIFIED (HCC): Primary | ICD-10-CM

## 2019-09-17 DIAGNOSIS — I10 ESSENTIAL HYPERTENSION: ICD-10-CM

## 2019-09-17 PROCEDURE — 99214 OFFICE O/P EST MOD 30 MIN: CPT | Performed by: FAMILY MEDICINE

## 2019-09-17 RX ORDER — ARIPIPRAZOLE 5 MG/1
5 TABLET ORAL DAILY
Qty: 30 TABLET | Refills: 2 | Status: CANCELLED | OUTPATIENT
Start: 2019-09-17

## 2019-09-17 RX ORDER — HYDROCHLOROTHIAZIDE 25 MG/1
25 TABLET ORAL DAILY
Qty: 30 TABLET | Refills: 2 | Status: SHIPPED | OUTPATIENT
Start: 2019-09-17 | End: 2020-04-15 | Stop reason: SDUPTHER

## 2019-09-17 RX ORDER — SILDENAFIL 100 MG/1
100 TABLET, FILM COATED ORAL SEE ADMIN INSTRUCTIONS
Qty: 10 TABLET | Refills: 0 | Status: SHIPPED | OUTPATIENT
Start: 2019-09-17 | End: 2019-10-05 | Stop reason: SDUPTHER

## 2019-09-17 RX ORDER — QUETIAPINE FUMARATE 25 MG/1
25 TABLET, FILM COATED ORAL NIGHTLY
Qty: 30 TABLET | Refills: 2 | Status: SHIPPED | OUTPATIENT
Start: 2019-09-17 | End: 2020-03-11 | Stop reason: DRUGHIGH

## 2019-09-17 RX ORDER — BENZONATATE 100 MG/1
200 CAPSULE ORAL 3 TIMES DAILY PRN
Qty: 42 CAPSULE | Refills: 1 | Status: SHIPPED | OUTPATIENT
Start: 2019-09-17 | End: 2020-03-11

## 2019-09-17 RX ORDER — BUSPIRONE HYDROCHLORIDE 10 MG/1
10 TABLET ORAL EVERY MORNING
Qty: 30 TABLET | Refills: 2 | Status: SHIPPED | OUTPATIENT
Start: 2019-09-17 | End: 2020-03-11

## 2019-09-17 RX ORDER — BENZTROPINE MESYLATE 1 MG/1
1 TABLET ORAL DAILY
Qty: 30 TABLET | Refills: 2 | Status: SHIPPED | OUTPATIENT
Start: 2019-09-17 | End: 2020-04-15 | Stop reason: SDUPTHER

## 2019-09-17 RX ORDER — AMLODIPINE BESYLATE 5 MG/1
5 TABLET ORAL DAILY
Qty: 30 TABLET | Refills: 3 | Status: SHIPPED | OUTPATIENT
Start: 2019-09-17 | End: 2020-04-15 | Stop reason: SDUPTHER

## 2019-09-17 NOTE — PROGRESS NOTES
Subjective   Bogdan Thompson is a 39 y.o. male.    cc: follow up of chronic medical issues  History of Present Illness The patient comes in for check of their chronic medical issues which include Hypertension and Bipolar Disorder.He needs refills. His BP has been up. He has been coughing some.       The following portions of the patient's history were reviewed and updated as appropriate: allergies, current medications, past family history, past medical history, past social history, past surgical history and problem list.    Review of Systems   Constitutional: Negative for fatigue and fever.   Respiratory: Negative for cough, chest tightness and stridor.    Cardiovascular: Negative for chest pain, palpitations and leg swelling.       Objective   Physical Exam   Constitutional: He is oriented to person, place, and time. He appears well-developed and well-nourished.   HENT:   Head: Normocephalic and atraumatic.   Right Ear: External ear normal.   Left Ear: External ear normal.   Nose: Nose normal.   Mouth/Throat: Oropharynx is clear and moist.   Eyes: Conjunctivae are normal.   Cardiovascular: Normal rate, regular rhythm and normal heart sounds. Exam reveals no gallop and no friction rub.   No murmur heard.  Pulmonary/Chest: Effort normal and breath sounds normal. No stridor. No respiratory distress. He has no wheezes. He has no rales.   Abdominal: Soft. Bowel sounds are normal. He exhibits no distension. There is no tenderness. There is no guarding.   Neurological: He is alert and oriented to person, place, and time.   Skin: Skin is warm and dry.   Vitals reviewed.        Assessment/Plan   Bogdan was seen today for follow-up and med refill.    Diagnoses and all orders for this visit:    Bipolar affective disorder, remission status unspecified (CMS/HCC)  -     Ambulatory Referral to Psychiatry    Essential hypertension  -     amLODIPine (NORVASC) 5 MG tablet; Take 1 tablet by mouth Daily.  -     Comprehensive metabolic  panel; Future  -     CBC w AUTO Differential; Future    Cough    Other orders  -     Cancel: ARIPiprazole (ABILIFY) 5 MG tablet; Take 1 tablet by mouth Daily.  -     benztropine (COGENTIN) 1 MG tablet; Take 1 tablet by mouth Daily.  -     busPIRone (BUSPAR) 10 MG tablet; Take 1 tablet by mouth Every Morning.  -     QUEtiapine (SEROQUEL) 25 MG tablet; Take 1 tablet by mouth Every Night.  -     hydrochlorothiazide (HYDRODIURIL) 25 MG tablet; Take 1 tablet by mouth Daily.  -     benzonatate (TESSALON PERLES) 100 MG capsule; Take 2 capsules by mouth 3 (Three) Times a Day As Needed for Cough.  -     sildenafil (VIAGRA) 100 MG tablet; Take 1 tablet by mouth See Admin Instructions.      Return to the clinic in 3 month/s.  Will contact with results as needed.

## 2019-10-07 RX ORDER — SILDENAFIL 100 MG/1
TABLET, FILM COATED ORAL
Qty: 10 TABLET | Refills: 0 | Status: SHIPPED | OUTPATIENT
Start: 2019-10-07 | End: 2019-11-13 | Stop reason: SDUPTHER

## 2019-11-13 ENCOUNTER — TELEPHONE (OUTPATIENT)
Dept: FAMILY MEDICINE CLINIC | Facility: CLINIC | Age: 39
End: 2019-11-13

## 2019-11-13 RX ORDER — SILDENAFIL 100 MG/1
100 TABLET, FILM COATED ORAL SEE ADMIN INSTRUCTIONS
Qty: 10 TABLET | Refills: 2 | Status: SHIPPED | OUTPATIENT
Start: 2019-11-13 | End: 2020-05-13 | Stop reason: SDUPTHER

## 2020-03-11 ENCOUNTER — OFFICE VISIT (OUTPATIENT)
Dept: FAMILY MEDICINE CLINIC | Facility: CLINIC | Age: 40
End: 2020-03-11

## 2020-03-11 ENCOUNTER — APPOINTMENT (OUTPATIENT)
Dept: LAB | Facility: HOSPITAL | Age: 40
End: 2020-03-11

## 2020-03-11 VITALS
WEIGHT: 158.25 LBS | HEART RATE: 91 BPM | DIASTOLIC BLOOD PRESSURE: 84 MMHG | BODY MASS INDEX: 22.15 KG/M2 | SYSTOLIC BLOOD PRESSURE: 148 MMHG | OXYGEN SATURATION: 100 % | HEIGHT: 71 IN

## 2020-03-11 DIAGNOSIS — F41.9 ANXIETY: ICD-10-CM

## 2020-03-11 DIAGNOSIS — G89.29 CHRONIC MIDLINE LOW BACK PAIN, UNSPECIFIED WHETHER SCIATICA PRESENT: Primary | ICD-10-CM

## 2020-03-11 DIAGNOSIS — M54.50 CHRONIC MIDLINE LOW BACK PAIN, UNSPECIFIED WHETHER SCIATICA PRESENT: Primary | ICD-10-CM

## 2020-03-11 DIAGNOSIS — I10 ESSENTIAL HYPERTENSION: ICD-10-CM

## 2020-03-11 PROCEDURE — 99214 OFFICE O/P EST MOD 30 MIN: CPT | Performed by: FAMILY MEDICINE

## 2020-03-11 PROCEDURE — 80307 DRUG TEST PRSMV CHEM ANLYZR: CPT | Performed by: FAMILY MEDICINE

## 2020-03-11 PROCEDURE — G0481 DRUG TEST DEF 8-14 CLASSES: HCPCS | Performed by: FAMILY MEDICINE

## 2020-03-11 RX ORDER — CYPROHEPTADINE HYDROCHLORIDE 4 MG/1
1 TABLET ORAL DAILY
COMMUNITY
Start: 2020-02-07

## 2020-03-11 RX ORDER — VENLAFAXINE 75 MG/1
1 TABLET ORAL DAILY
COMMUNITY
Start: 2020-02-07 | End: 2020-05-13

## 2020-03-11 RX ORDER — TRAZODONE HYDROCHLORIDE 100 MG/1
1 TABLET ORAL
COMMUNITY
Start: 2020-02-07 | End: 2020-05-13

## 2020-03-11 RX ORDER — ARIPIPRAZOLE 5 MG/1
1 TABLET ORAL DAILY
COMMUNITY
Start: 2020-02-07 | End: 2020-05-13 | Stop reason: SDUPTHER

## 2020-03-11 RX ORDER — PROPRANOLOL HYDROCHLORIDE 10 MG/1
1 TABLET ORAL 2 TIMES DAILY
COMMUNITY
Start: 2020-02-07 | End: 2020-05-13 | Stop reason: SDUPTHER

## 2020-03-11 RX ORDER — QUETIAPINE FUMARATE 200 MG/1
1 TABLET, FILM COATED ORAL
COMMUNITY
End: 2020-03-11

## 2020-03-11 RX ORDER — BUSPIRONE HYDROCHLORIDE 15 MG/1
1 TABLET ORAL DAILY
COMMUNITY
Start: 2020-02-07

## 2020-03-11 RX ORDER — GABAPENTIN 300 MG/1
1 CAPSULE ORAL 3 TIMES DAILY
COMMUNITY

## 2020-03-11 NOTE — PROGRESS NOTES
Subjective   Bogdan Thompson is a 40 y.o. male.     Anxiety   Presents for follow-up visit. Patient reports no chest pain, feeling of choking or shortness of breath. Symptoms occur most days. The quality of sleep is fair.       Pain   This is a chronic problem. The current episode started more than 1 year ago. The problem occurs daily. The problem has been unchanged. Pertinent negatives include no abdominal pain, anorexia, chest pain, congestion, headaches, myalgias or urinary symptoms.   Hypertension   This is a chronic problem. The current episode started more than 1 year ago. The problem is unchanged. Associated symptoms include anxiety. Pertinent negatives include no blurred vision, chest pain, headaches, malaise/fatigue, peripheral edema, PND or shortness of breath. Current antihypertension treatment includes calcium channel blockers and diuretics.     He is no longer seeing pain management. Will refer to another Pain Management group.  The following portions of the patient's history were reviewed and updated as appropriate: allergies, current medications, past family history, past medical history, past social history, past surgical history and problem list.    Review of Systems   Constitutional: Negative for malaise/fatigue.   HENT: Negative for congestion.    Eyes: Negative for blurred vision.   Respiratory: Negative for shortness of breath.    Cardiovascular: Negative for chest pain and PND.   Gastrointestinal: Negative for abdominal pain and anorexia.   Musculoskeletal: Positive for back pain. Negative for myalgias.   Neurological: Negative for headaches.       Objective   Physical Exam   Constitutional: He is oriented to person, place, and time. He appears well-developed and well-nourished.   HENT:   Head: Normocephalic and atraumatic.   Right Ear: External ear normal.   Left Ear: External ear normal.   Nose: Nose normal.   Mouth/Throat: Oropharynx is clear and moist.   Eyes: Conjunctivae are normal.   Neck:  "Normal range of motion.   Cardiovascular: Normal rate, regular rhythm and normal heart sounds. Exam reveals no gallop and no friction rub.   No murmur heard.  Pulmonary/Chest: Effort normal and breath sounds normal. No stridor. No respiratory distress. He has no wheezes. He has no rales.   Abdominal: Soft. Bowel sounds are normal. He exhibits no distension. There is no tenderness.   Musculoskeletal:   The low back area is tender on palpation.   Neurological: He is alert and oriented to person, place, and time.   Skin: Skin is warm and dry.   Vitals reviewed.        Visit Vitals  /84   Pulse 91   Ht 180.3 cm (71\")   Wt 71.8 kg (158 lb 4 oz)   SpO2 100%   BMI 22.07 kg/m²     Body mass index is 22.07 kg/m².      Assessment/Plan   Bogdan was seen today for bipolar affective disorder, anxiety and depression.    Diagnoses and all orders for this visit:    Chronic midline low back pain, unspecified whether sciatica present  -     Ambulatory Referral to Pain Management  -     ToxASSURE Select 13 (MW) - Urine, Clean Catch    Anxiety    Essential hypertension  -     Comprehensive metabolic panel; Future  -     CBC w AUTO Differential; Future    Will contact with labs. If the Toxassure is negative ,will refill his pain medication.  He is to return in a month.  GRAHAM is appropriate-[id number]  "

## 2020-03-12 ENCOUNTER — TELEPHONE (OUTPATIENT)
Dept: FAMILY MEDICINE CLINIC | Facility: CLINIC | Age: 40
End: 2020-03-12

## 2020-03-13 ENCOUNTER — TELEPHONE (OUTPATIENT)
Dept: FAMILY MEDICINE CLINIC | Facility: CLINIC | Age: 40
End: 2020-03-13

## 2020-03-13 DIAGNOSIS — R52 PAIN: Primary | ICD-10-CM

## 2020-03-13 RX ORDER — OXYCODONE HYDROCHLORIDE AND ACETAMINOPHEN 5; 325 MG/1; MG/1
1 TABLET ORAL 4 TIMES DAILY PRN
Qty: 120 TABLET | Refills: 0 | Status: SHIPPED | OUTPATIENT
Start: 2020-03-13 | End: 2020-04-15 | Stop reason: DRUGHIGH

## 2020-03-13 NOTE — TELEPHONE ENCOUNTER
Pt called stating he had his urinalysis done on 3- wanting to know when his Rx will be called into [facility] in [place]. He stated that the Pharmacy has called about this also.

## 2020-03-13 NOTE — TELEPHONE ENCOUNTER
I have done a GRAHAM and it is appropriate. TOXASSURE is pending and won't be back for 7 days. Heis aware if their is any inappropriate substance present, that this will be his last prescription for Opioids from me.

## 2020-03-18 LAB — CONV REPORT SUMMARY: NORMAL

## 2020-04-15 ENCOUNTER — OFFICE VISIT (OUTPATIENT)
Dept: FAMILY MEDICINE CLINIC | Facility: CLINIC | Age: 40
End: 2020-04-15

## 2020-04-15 VITALS — HEIGHT: 71 IN | WEIGHT: 158 LBS | BODY MASS INDEX: 22.12 KG/M2

## 2020-04-15 DIAGNOSIS — I10 ESSENTIAL HYPERTENSION: ICD-10-CM

## 2020-04-15 DIAGNOSIS — R52 PAIN: ICD-10-CM

## 2020-04-15 PROCEDURE — 99212 OFFICE O/P EST SF 10 MIN: CPT | Performed by: FAMILY MEDICINE

## 2020-04-15 RX ORDER — ALBUTEROL SULFATE 90 UG/1
2 AEROSOL, METERED RESPIRATORY (INHALATION) EVERY 4 HOURS PRN
Qty: 18 G | Refills: 6 | Status: CANCELLED | OUTPATIENT
Start: 2020-04-15

## 2020-04-15 RX ORDER — OXYCODONE HYDROCHLORIDE AND ACETAMINOPHEN 5; 325 MG/1; MG/1
1 TABLET ORAL 4 TIMES DAILY PRN
Qty: 120 TABLET | Refills: 0 | Status: CANCELLED | OUTPATIENT
Start: 2020-04-15

## 2020-04-15 RX ORDER — BENZTROPINE MESYLATE 1 MG/1
1 TABLET ORAL DAILY
Qty: 30 TABLET | Refills: 2 | Status: SHIPPED | OUTPATIENT
Start: 2020-04-15 | End: 2020-05-13 | Stop reason: SDUPTHER

## 2020-04-15 RX ORDER — ALBUTEROL SULFATE 90 UG/1
2 AEROSOL, METERED RESPIRATORY (INHALATION) EVERY 4 HOURS PRN
Qty: 1 INHALER | Refills: 0 | Status: SHIPPED | OUTPATIENT
Start: 2020-04-15 | End: 2020-05-13 | Stop reason: SDUPTHER

## 2020-04-15 RX ORDER — HYDROCHLOROTHIAZIDE 25 MG/1
25 TABLET ORAL DAILY
Qty: 30 TABLET | Refills: 2 | Status: SHIPPED | OUTPATIENT
Start: 2020-04-15 | End: 2020-05-13

## 2020-04-15 RX ORDER — OXYCODONE AND ACETAMINOPHEN 7.5; 325 MG/1; MG/1
1 TABLET ORAL EVERY 6 HOURS PRN
Qty: 120 TABLET | Refills: 0 | Status: SHIPPED | OUTPATIENT
Start: 2020-04-15 | End: 2020-06-12

## 2020-04-15 RX ORDER — AMLODIPINE BESYLATE 5 MG/1
5 TABLET ORAL DAILY
Qty: 30 TABLET | Refills: 3 | Status: SHIPPED | OUTPATIENT
Start: 2020-04-15 | End: 2020-05-13 | Stop reason: SDUPTHER

## 2020-04-15 NOTE — PROGRESS NOTES
"Subjective   Bogdan Thompson is a 40 y.o. male.   Cc: follow up of chronic medical issues    Back Pain   This is a chronic problem. The current episode started more than 1 year ago. The problem occurs daily. The problem is unchanged. The pain is present in the lumbar spine. The quality of the pain is described as stabbing and burning. The pain radiates to the right foot. The pain is at a severity of 7/10. The pain is moderate. The pain is the same all the time. The symptoms are aggravated by twisting and standing. Pertinent negatives include no abdominal pain, bladder incontinence, bowel incontinence, chest pain, dysuria, fever, headaches, leg pain, numbness, paresthesias, tingling or weakness. The treatment provided mild relief.   Hypertension   This is a chronic problem. The current episode started more than 1 year ago. The problem has been gradually improving since onset. Pertinent negatives include no chest pain or headaches. Current antihypertension treatment includes beta blockers and calcium channel blockers.     He is at his base line.He needs refills of several medications.   The following portions of the patient's history were reviewed and updated as appropriate: allergies, current medications, past family history, past medical history, past social history, past surgical history and problem list.    Review of Systems   Constitutional: Negative for fever.   Cardiovascular: Negative for chest pain.   Gastrointestinal: Negative for abdominal pain and bowel incontinence.   Genitourinary: Negative for bladder incontinence and dysuria.   Musculoskeletal: Positive for back pain.   Neurological: Negative for tingling, weakness, numbness, headaches and paresthesias.       Objective   Physical Exam      Visit Vitals  Ht 180.3 cm (71\")   Wt 71.7 kg (158 lb)   BMI 22.04 kg/m²     Body mass index is 22.04 kg/m².      Assessment/Plan   Bogdan was seen today for follow-up and back pain.    Diagnoses and all orders for this " visit:    Essential hypertension  -     amLODIPine (NORVASC) 5 MG tablet; Take 1 tablet by mouth Daily.    Pain  -     oxyCODONE-acetaminophen (Percocet) 7.5-325 MG per tablet; Take 1 tablet by mouth Every 6 (Six) Hours As Needed (120).  -     Cane    Other orders  -     benztropine (COGENTIN) 1 MG tablet; Take 1 tablet by mouth Daily.  -     hydroCHLOROthiazide (HYDRODIURIL) 25 MG tablet; Take 1 tablet by mouth Daily.  -     albuterol sulfate  (90 Base) MCG/ACT inhaler; Inhale 2 puffs Every 4 (Four) Hours As Needed for Wheezing.    GRAHAM is appropriate-[id number]  Return to the clinic in 1 month/s.  Will contact with results as needed.  Time spent is 13 minutes.

## 2020-04-29 ENCOUNTER — TRANSCRIBE ORDERS (OUTPATIENT)
Dept: PHYSICAL THERAPY | Facility: CLINIC | Age: 40
End: 2020-04-29

## 2020-04-29 DIAGNOSIS — M25.551 PAIN OF RIGHT HIP JOINT: Primary | ICD-10-CM

## 2020-04-29 DIAGNOSIS — M79.604 PAIN OF RIGHT LOWER EXTREMITY: ICD-10-CM

## 2020-05-13 ENCOUNTER — OFFICE VISIT (OUTPATIENT)
Dept: FAMILY MEDICINE CLINIC | Facility: CLINIC | Age: 40
End: 2020-05-13

## 2020-05-13 VITALS
WEIGHT: 144.31 LBS | DIASTOLIC BLOOD PRESSURE: 82 MMHG | OXYGEN SATURATION: 100 % | BODY MASS INDEX: 20.2 KG/M2 | HEART RATE: 87 BPM | SYSTOLIC BLOOD PRESSURE: 148 MMHG | HEIGHT: 71 IN

## 2020-05-13 DIAGNOSIS — I10 ESSENTIAL HYPERTENSION: Primary | ICD-10-CM

## 2020-05-13 DIAGNOSIS — F33.9 RECURRENT MAJOR DEPRESSIVE DISORDER, REMISSION STATUS UNSPECIFIED (HCC): ICD-10-CM

## 2020-05-13 DIAGNOSIS — G89.4 CHRONIC PAIN SYNDROME: ICD-10-CM

## 2020-05-13 DIAGNOSIS — Z72.0 TOBACCO ABUSE: ICD-10-CM

## 2020-05-13 DIAGNOSIS — F41.9 ANXIETY: ICD-10-CM

## 2020-05-13 PROBLEM — F52.21 ED (ERECTILE DYSFUNCTION) OF NON-ORGANIC ORIGIN: Status: ACTIVE | Noted: 2020-05-13

## 2020-05-13 PROCEDURE — 99214 OFFICE O/P EST MOD 30 MIN: CPT | Performed by: FAMILY MEDICINE

## 2020-05-13 RX ORDER — HALOPERIDOL 5 MG/1
TABLET ORAL
COMMUNITY
Start: 2020-04-22 | End: 2020-05-13 | Stop reason: SDUPTHER

## 2020-05-13 RX ORDER — BENZTROPINE MESYLATE 1 MG/1
1 TABLET ORAL DAILY
Qty: 30 TABLET | Refills: 2 | Status: SHIPPED | OUTPATIENT
Start: 2020-05-13 | End: 2020-06-12 | Stop reason: SDUPTHER

## 2020-05-13 RX ORDER — QUETIAPINE FUMARATE 200 MG/1
200 TABLET, FILM COATED ORAL NIGHTLY
Qty: 30 TABLET | Refills: 3 | Status: SHIPPED | OUTPATIENT
Start: 2020-05-13 | End: 2020-06-12 | Stop reason: SDUPTHER

## 2020-05-13 RX ORDER — ARIPIPRAZOLE 5 MG/1
5 TABLET ORAL DAILY
Qty: 30 TABLET | Refills: 3 | Status: SHIPPED | OUTPATIENT
Start: 2020-05-13 | End: 2020-07-13 | Stop reason: SDUPTHER

## 2020-05-13 RX ORDER — AMLODIPINE BESYLATE 5 MG/1
5 TABLET ORAL DAILY
Qty: 30 TABLET | Refills: 3 | Status: SHIPPED | OUTPATIENT
Start: 2020-05-13 | End: 2020-06-12 | Stop reason: DRUGHIGH

## 2020-05-13 RX ORDER — POTASSIUM CHLORIDE 20 MEQ/1
20 TABLET, EXTENDED RELEASE ORAL DAILY
Qty: 30 TABLET | Refills: 2 | Status: SHIPPED | OUTPATIENT
Start: 2020-05-13 | End: 2020-06-12 | Stop reason: SDUPTHER

## 2020-05-13 RX ORDER — PROPRANOLOL HYDROCHLORIDE 10 MG/1
10 TABLET ORAL 2 TIMES DAILY
Qty: 60 TABLET | Refills: 2 | Status: SHIPPED | OUTPATIENT
Start: 2020-05-13 | End: 2020-06-12 | Stop reason: SDUPTHER

## 2020-05-13 RX ORDER — OXYCODONE AND ACETAMINOPHEN 7.5; 325 MG/1; MG/1
1 TABLET ORAL EVERY 6 HOURS PRN
Qty: 120 TABLET | Refills: 0 | Status: SHIPPED | OUTPATIENT
Start: 2020-05-13 | End: 2020-06-12 | Stop reason: SDUPTHER

## 2020-05-13 RX ORDER — SILDENAFIL 100 MG/1
100 TABLET, FILM COATED ORAL SEE ADMIN INSTRUCTIONS
Qty: 10 TABLET | Refills: 2 | Status: SHIPPED | OUTPATIENT
Start: 2020-05-13 | End: 2020-06-12 | Stop reason: SDUPTHER

## 2020-05-13 RX ORDER — PRAZOSIN HYDROCHLORIDE 1 MG/1
CAPSULE ORAL
COMMUNITY
Start: 2020-04-22 | End: 2021-01-11

## 2020-05-13 RX ORDER — NICOTINE 21 MG/24HR
1 PATCH, TRANSDERMAL 24 HOURS TRANSDERMAL EVERY 24 HOURS
Qty: 30 PATCH | Refills: 0 | Status: SHIPPED | OUTPATIENT
Start: 2020-05-13 | End: 2020-08-13

## 2020-05-13 RX ORDER — HALOPERIDOL 5 MG/1
5 TABLET ORAL DAILY
Qty: 30 TABLET | Refills: 3 | Status: SHIPPED | OUTPATIENT
Start: 2020-05-13 | End: 2020-07-13 | Stop reason: SDUPTHER

## 2020-05-13 RX ORDER — ALBUTEROL SULFATE 90 UG/1
2 AEROSOL, METERED RESPIRATORY (INHALATION) EVERY 4 HOURS PRN
Qty: 1 INHALER | Refills: 1 | Status: SHIPPED | OUTPATIENT
Start: 2020-05-13 | End: 2021-06-08 | Stop reason: SDUPTHER

## 2020-05-13 RX ORDER — HYDROCHLOROTHIAZIDE 25 MG/1
25 TABLET ORAL DAILY
Qty: 30 TABLET | Refills: 2 | Status: SHIPPED | OUTPATIENT
Start: 2020-05-13 | End: 2020-06-12 | Stop reason: SDUPTHER

## 2020-05-13 RX ORDER — QUETIAPINE FUMARATE 200 MG/1
TABLET, FILM COATED ORAL
COMMUNITY
Start: 2020-04-22 | End: 2020-05-13 | Stop reason: SDUPTHER

## 2020-05-13 NOTE — PROGRESS NOTES
Subjective   Bogdan Thompson is a 40 y.o. male.   Cc: follow up of chronic medical issues    Pain   This is a chronic problem. The current episode started more than 1 year ago. The problem occurs daily. The problem has been gradually improving. Pertinent negatives include no abdominal pain, anorexia, arthralgias, chest pain, chills, congestion, joint swelling, neck pain, numbness, sore throat, swollen glands or vertigo.   Hypertension   This is a chronic problem. The current episode started more than 1 year ago. The problem has been gradually improving since onset. Pertinent negatives include no anxiety, blurred vision, chest pain, neck pain, orthopnea, peripheral edema, PND, shortness of breath or sweats. Current antihypertension treatment includes calcium channel blockers and beta blockers. Hypertensive end-organ damage includes CVA.   Nicotine Dependence   Presents for follow-up visit. Symptoms include insomnia. Symptoms are negative for irritability and sore throat. His urge triggers include company of smokers. The symptoms have been stable. He smokes 1 pack of cigarettes per day.   Anxiety   Presents for follow-up visit. Symptoms include insomnia and nervous/anxious behavior. Patient reports no chest pain, compulsions, dizziness, feeling of choking, irritability, obsessions, panic, restlessness or shortness of breath.       He is requesting refills of several medications.He also wants a prescription for a Nicotine patch.    He is wanting to stop smoking.    The following portions of the patient's history were reviewed and updated as appropriate: allergies, current medications, past family history, past medical history, past social history, past surgical history and problem list.    Review of Systems   Constitutional: Negative for chills and irritability.   HENT: Negative for congestion and sore throat.    Eyes: Negative for blurred vision.   Respiratory: Negative for shortness of breath.    Cardiovascular: Negative  "for chest pain, orthopnea and PND.   Gastrointestinal: Negative for abdominal pain and anorexia.   Musculoskeletal: Negative for arthralgias, joint swelling and neck pain.   Neurological: Negative for dizziness, vertigo and numbness.   Psychiatric/Behavioral: The patient is nervous/anxious and has insomnia.        Objective   Physical Exam   Constitutional: He is oriented to person, place, and time. He appears well-developed and well-nourished.   HENT:   Head: Normocephalic and atraumatic.   Right Ear: External ear normal.   Left Ear: External ear normal.   Nose: Nose normal.   Mouth/Throat: Oropharynx is clear and moist.   Eyes: Conjunctivae are normal.   Neck: Normal range of motion.   Cardiovascular: Normal rate, regular rhythm and normal heart sounds. Exam reveals no gallop and no friction rub.   No murmur heard.  Pulmonary/Chest: Effort normal and breath sounds normal. No stridor. No respiratory distress. He has no wheezes. He has no rales.   Abdominal: Soft. Bowel sounds are normal.   Musculoskeletal:   Back has tenderness in the lower Lumbar area.   Neurological: He is alert and oriented to person, place, and time.   Skin: Skin is warm and dry.   Vitals reviewed.        Visit Vitals  /82   Pulse 87   Ht 180.3 cm (71\")   Wt 65.5 kg (144 lb 5 oz)   SpO2 100%   BMI 20.13 kg/m²     Body mass index is 20.13 kg/m².      Assessment/Plan   Bogdan was seen today for follow-up and hypertension.    Diagnoses and all orders for this visit:    Essential hypertension  -     amLODIPine (NORVASC) 5 MG tablet; Take 1 tablet by mouth Daily.    Tobacco abuse    Chronic pain syndrome  -     oxyCODONE-acetaminophen (Percocet) 7.5-325 MG per tablet; Take 1 tablet by mouth Every 6 (Six) Hours As Needed (pain).    Recurrent major depressive disorder, remission status unspecified (CMS/HCC)    Anxiety    Other orders  -     benztropine (COGENTIN) 1 MG tablet; Take 1 tablet by mouth Daily.  -     hydroCHLOROthiazide (HYDRODIURIL) " 25 MG tablet; Take 1 tablet by mouth Daily.  -     sildenafil (VIAGRA) 100 MG tablet; Take 1 tablet by mouth See Admin Instructions.  -     albuterol sulfate  (90 Base) MCG/ACT inhaler; Inhale 2 puffs Every 4 (Four) Hours As Needed for Wheezing.  -     ARIPiprazole (ABILIFY) 5 MG tablet; Take 1 tablet by mouth Daily.  -     haloperidol (HALDOL) 5 MG tablet; Take 1 tablet by mouth Daily.  -     potassium chloride (K-DUR,KLOR-CON) 20 MEQ CR tablet; Take 1 tablet by mouth Daily.  -     propranolol (INDERAL) 10 MG tablet; Take 1 tablet by mouth 2 (Two) Times a Day.  -     QUEtiapine (SEROquel) 200 MG tablet; Take 1 tablet by mouth Every Night.  -     nicotine (NICODERM CQ) 21 MG/24HR patch; Place 1 patch on the skin as directed by provider Daily.    Return to the clinic in 1 month/s.  Will contact with results as needed.  GRAHAM is appropriate-[id number]

## 2020-06-12 ENCOUNTER — LAB (OUTPATIENT)
Dept: LAB | Facility: HOSPITAL | Age: 40
End: 2020-06-12

## 2020-06-12 ENCOUNTER — OFFICE VISIT (OUTPATIENT)
Dept: FAMILY MEDICINE CLINIC | Facility: CLINIC | Age: 40
End: 2020-06-12

## 2020-06-12 VITALS
HEIGHT: 71 IN | OXYGEN SATURATION: 98 % | BODY MASS INDEX: 20.08 KG/M2 | HEART RATE: 123 BPM | SYSTOLIC BLOOD PRESSURE: 146 MMHG | WEIGHT: 143.44 LBS | DIASTOLIC BLOOD PRESSURE: 90 MMHG

## 2020-06-12 DIAGNOSIS — I10 ESSENTIAL HYPERTENSION: ICD-10-CM

## 2020-06-12 DIAGNOSIS — Z72.0 TOBACCO ABUSE: Primary | ICD-10-CM

## 2020-06-12 DIAGNOSIS — G47.00 INSOMNIA, UNSPECIFIED TYPE: ICD-10-CM

## 2020-06-12 DIAGNOSIS — G89.4 CHRONIC PAIN SYNDROME: ICD-10-CM

## 2020-06-12 LAB
ALBUMIN SERPL-MCNC: 4.6 G/DL (ref 3.5–5.2)
ALBUMIN/GLOB SERPL: 1.3 G/DL
ALP SERPL-CCNC: 71 U/L (ref 39–117)
ALT SERPL W P-5'-P-CCNC: 19 U/L (ref 1–41)
ANION GAP SERPL CALCULATED.3IONS-SCNC: 10.9 MMOL/L (ref 5–15)
AST SERPL-CCNC: 22 U/L (ref 1–40)
BASOPHILS # BLD AUTO: 0.02 10*3/MM3 (ref 0–0.2)
BASOPHILS NFR BLD AUTO: 0.3 % (ref 0–1.5)
BILIRUB SERPL-MCNC: 0.4 MG/DL (ref 0.2–1.2)
BUN BLD-MCNC: 10 MG/DL (ref 6–20)
BUN/CREAT SERPL: 8.9 (ref 7–25)
CALCIUM SPEC-SCNC: 9.8 MG/DL (ref 8.6–10.5)
CHLORIDE SERPL-SCNC: 103 MMOL/L (ref 98–107)
CO2 SERPL-SCNC: 25.1 MMOL/L (ref 22–29)
CREAT BLD-MCNC: 1.12 MG/DL (ref 0.76–1.27)
DEPRECATED RDW RBC AUTO: 39.9 FL (ref 37–54)
EOSINOPHIL # BLD AUTO: 0.28 10*3/MM3 (ref 0–0.4)
EOSINOPHIL NFR BLD AUTO: 4.9 % (ref 0.3–6.2)
ERYTHROCYTE [DISTWIDTH] IN BLOOD BY AUTOMATED COUNT: 12.3 % (ref 12.3–15.4)
GFR SERPL CREATININE-BSD FRML MDRD: 87 ML/MIN/1.73
GLOBULIN UR ELPH-MCNC: 3.5 GM/DL
GLUCOSE BLD-MCNC: 98 MG/DL (ref 65–99)
HCT VFR BLD AUTO: 42.6 % (ref 37.5–51)
HGB BLD-MCNC: 15.3 G/DL (ref 13–17.7)
IMM GRANULOCYTES # BLD AUTO: 0.02 10*3/MM3 (ref 0–0.05)
IMM GRANULOCYTES NFR BLD AUTO: 0.3 % (ref 0–0.5)
LYMPHOCYTES # BLD AUTO: 1.29 10*3/MM3 (ref 0.7–3.1)
LYMPHOCYTES NFR BLD AUTO: 22.5 % (ref 19.6–45.3)
MCH RBC QN AUTO: 32.6 PG (ref 26.6–33)
MCHC RBC AUTO-ENTMCNC: 35.9 G/DL (ref 31.5–35.7)
MCV RBC AUTO: 90.6 FL (ref 79–97)
MONOCYTES # BLD AUTO: 0.43 10*3/MM3 (ref 0.1–0.9)
MONOCYTES NFR BLD AUTO: 7.5 % (ref 5–12)
NEUTROPHILS # BLD AUTO: 3.7 10*3/MM3 (ref 1.7–7)
NEUTROPHILS NFR BLD AUTO: 64.5 % (ref 42.7–76)
NRBC BLD AUTO-RTO: 0 /100 WBC (ref 0–0.2)
PLATELET # BLD AUTO: 233 10*3/MM3 (ref 140–450)
PMV BLD AUTO: 10.4 FL (ref 6–12)
POTASSIUM BLD-SCNC: 4 MMOL/L (ref 3.5–5.2)
PROT SERPL-MCNC: 8.1 G/DL (ref 6–8.5)
RBC # BLD AUTO: 4.7 10*6/MM3 (ref 4.14–5.8)
SODIUM BLD-SCNC: 139 MMOL/L (ref 136–145)
WBC NRBC COR # BLD: 5.74 10*3/MM3 (ref 3.4–10.8)

## 2020-06-12 PROCEDURE — 85025 COMPLETE CBC W/AUTO DIFF WBC: CPT

## 2020-06-12 PROCEDURE — 36415 COLL VENOUS BLD VENIPUNCTURE: CPT

## 2020-06-12 PROCEDURE — 80307 DRUG TEST PRSMV CHEM ANLYZR: CPT | Performed by: FAMILY MEDICINE

## 2020-06-12 PROCEDURE — G0481 DRUG TEST DEF 8-14 CLASSES: HCPCS | Performed by: FAMILY MEDICINE

## 2020-06-12 PROCEDURE — 80053 COMPREHEN METABOLIC PANEL: CPT

## 2020-06-12 PROCEDURE — 99214 OFFICE O/P EST MOD 30 MIN: CPT | Performed by: FAMILY MEDICINE

## 2020-06-12 RX ORDER — SILDENAFIL 100 MG/1
100 TABLET, FILM COATED ORAL SEE ADMIN INSTRUCTIONS
Qty: 10 TABLET | Refills: 2 | Status: SHIPPED | OUTPATIENT
Start: 2020-06-12 | End: 2020-07-13 | Stop reason: SDUPTHER

## 2020-06-12 RX ORDER — NICOTINE 21 MG/24HR
1 PATCH, TRANSDERMAL 24 HOURS TRANSDERMAL EVERY 24 HOURS
Qty: 30 PATCH | Refills: 5 | Status: CANCELLED | OUTPATIENT
Start: 2020-06-12

## 2020-06-12 RX ORDER — NICOTINE 21 MG/24HR
1 PATCH, TRANSDERMAL 24 HOURS TRANSDERMAL EVERY 24 HOURS
Qty: 28 PATCH | Refills: 0 | Status: SHIPPED | OUTPATIENT
Start: 2020-06-12 | End: 2020-08-13

## 2020-06-12 RX ORDER — QUETIAPINE FUMARATE 200 MG/1
200 TABLET, FILM COATED ORAL NIGHTLY
Qty: 30 TABLET | Refills: 5 | Status: SHIPPED | OUTPATIENT
Start: 2020-06-12 | End: 2020-09-15 | Stop reason: SDUPTHER

## 2020-06-12 RX ORDER — PROPRANOLOL HYDROCHLORIDE 10 MG/1
10 TABLET ORAL 2 TIMES DAILY
Qty: 60 TABLET | Refills: 4 | Status: SHIPPED | OUTPATIENT
Start: 2020-06-12 | End: 2020-09-15 | Stop reason: SDUPTHER

## 2020-06-12 RX ORDER — BENZTROPINE MESYLATE 1 MG/1
1 TABLET ORAL DAILY
Qty: 30 TABLET | Refills: 2 | Status: SHIPPED | OUTPATIENT
Start: 2020-06-12 | End: 2020-07-13 | Stop reason: SDUPTHER

## 2020-06-12 RX ORDER — POTASSIUM CHLORIDE 20 MEQ/1
20 TABLET, EXTENDED RELEASE ORAL DAILY
Qty: 30 TABLET | Refills: 4 | Status: SHIPPED | OUTPATIENT
Start: 2020-06-12 | End: 2021-01-11 | Stop reason: SDUPTHER

## 2020-06-12 RX ORDER — HYDROCHLOROTHIAZIDE 25 MG/1
25 TABLET ORAL DAILY
Qty: 30 TABLET | Refills: 2 | Status: SHIPPED | OUTPATIENT
Start: 2020-06-12 | End: 2020-08-13 | Stop reason: SDUPTHER

## 2020-06-12 RX ORDER — OXYCODONE AND ACETAMINOPHEN 7.5; 325 MG/1; MG/1
1 TABLET ORAL EVERY 6 HOURS PRN
Qty: 120 TABLET | Refills: 0 | Status: SHIPPED | OUTPATIENT
Start: 2020-06-12 | End: 2020-07-13 | Stop reason: SDUPTHER

## 2020-06-12 RX ORDER — AMLODIPINE BESYLATE 10 MG/1
10 TABLET ORAL DAILY
Qty: 30 TABLET | Refills: 2 | Status: SHIPPED | OUTPATIENT
Start: 2020-06-12 | End: 2020-07-13 | Stop reason: SDUPTHER

## 2020-06-12 NOTE — PROGRESS NOTES
Subjective   Bogdan Thompson is a 40 y.o. male.     Hypertension   This is a chronic problem. The current episode started more than 1 year ago. The problem has been gradually improving since onset. The problem is controlled. Associated symptoms include anxiety. Pertinent negatives include no blurred vision, chest pain, headaches, malaise/fatigue, neck pain, orthopnea, palpitations, peripheral edema, PND, shortness of breath or sweats. Current antihypertension treatment includes calcium channel blockers and beta blockers.   Back Pain   This is a chronic problem. The current episode started more than 1 year ago. The problem occurs daily. The problem is unchanged. The pain is present in the lumbar spine. Pertinent negatives include no abdominal pain, bladder incontinence, bowel incontinence, chest pain, fever, headaches, perianal numbness or weakness.   Insomnia   This is a chronic problem. The current episode started more than 1 year ago. The problem occurs daily. Pertinent negatives include no abdominal pain, anorexia, arthralgias, chest pain, coughing, fatigue, fever, headaches, myalgias, nausea, neck pain, sore throat, vertigo or weakness.     He is still trying to quit smoking. He has cut back.He is due to start on the 14 mg patches.  The following portions of the patient's history were reviewed and updated as appropriate: allergies, current medications, past family history, past medical history, past social history, past surgical history and problem list.    Review of Systems   Constitutional: Negative for fatigue, fever and malaise/fatigue.   HENT: Negative for sore throat.    Eyes: Negative for blurred vision.   Respiratory: Negative for cough and shortness of breath.    Cardiovascular: Negative for chest pain, palpitations, orthopnea and PND.   Gastrointestinal: Negative for abdominal pain, anorexia, bowel incontinence and nausea.   Genitourinary: Negative for bladder incontinence.   Musculoskeletal: Positive for  "back pain. Negative for arthralgias, myalgias and neck pain.   Neurological: Negative for vertigo, weakness and headaches.   Psychiatric/Behavioral: The patient has insomnia.        Objective   Physical Exam   Constitutional: He is oriented to person, place, and time. He appears well-developed and well-nourished.   HENT:   Head: Normocephalic and atraumatic.   Right Ear: External ear normal.   Left Ear: External ear normal.   Nose: Nose normal.   Mouth/Throat: Oropharynx is clear and moist.   Eyes: Pupils are equal, round, and reactive to light. EOM are normal.   Neck: Normal range of motion.   Cardiovascular: Normal rate, regular rhythm and normal heart sounds. Exam reveals no gallop and no friction rub.   No murmur heard.  Pulmonary/Chest: Effort normal and breath sounds normal. No stridor. No respiratory distress. He has no wheezes. He has no rales.   Abdominal: Soft. Bowel sounds are normal. He exhibits no distension and no mass. There is no tenderness. There is no guarding.   Musculoskeletal:   The Lumbar area is tender to palpation.   Neurological: He is alert and oriented to person, place, and time.   Skin: Skin is warm and dry.   Vitals reviewed.        Visit Vitals  /90   Pulse (!) 123   Ht 180.3 cm (71\")   Wt 65.1 kg (143 lb 7 oz)   SpO2 98%   BMI 20.01 kg/m²     Body mass index is 20.01 kg/m².      Assessment/Plan   Bogdan was seen today for follow-up, hypertension and med refill.    Diagnoses and all orders for this visit:    Tobacco abuse    Chronic pain syndrome  -     oxyCODONE-acetaminophen (Percocet) 7.5-325 MG per tablet; Take 1 tablet by mouth Every 6 (Six) Hours As Needed (pain).    Essential hypertension  -     Comprehensive metabolic panel; Future  -     CBC w AUTO Differential; Future  -     ToxASSURE Select 13 (MW) - Urine, Clean Catch    Insomnia, unspecified type    Other orders  -     benztropine (COGENTIN) 1 MG tablet; Take 1 tablet by mouth Daily.  -     hydroCHLOROthiazide " (HYDRODIURIL) 25 MG tablet; Take 1 tablet by mouth Daily.  -     potassium chloride (K-DUR,KLOR-CON) 20 MEQ CR tablet; Take 1 tablet by mouth Daily.  -     propranolol (INDERAL) 10 MG tablet; Take 1 tablet by mouth 2 (Two) Times a Day.  -     QUEtiapine (SEROquel) 200 MG tablet; Take 1 tablet by mouth Every Night.  -     sildenafil (VIAGRA) 100 MG tablet; Take 1 tablet by mouth See Admin Instructions.  -     nicotine (Nicoderm CQ) 14 MG/24HR patch; Place 1 patch on the skin as directed by provider Daily.  -     amLODIPine (NORVASC) 10 MG tablet; Take 1 tablet by mouth Daily.    Return to the clinic in 1 month/s.  Will contact with results as needed.  GRAHAM is appropriate-[id number]

## 2020-06-17 LAB — CONV REPORT SUMMARY: NORMAL

## 2020-07-13 ENCOUNTER — OFFICE VISIT (OUTPATIENT)
Dept: FAMILY MEDICINE CLINIC | Facility: CLINIC | Age: 40
End: 2020-07-13

## 2020-07-13 VITALS
HEART RATE: 82 BPM | WEIGHT: 153.06 LBS | OXYGEN SATURATION: 98 % | BODY MASS INDEX: 21.43 KG/M2 | HEIGHT: 71 IN | SYSTOLIC BLOOD PRESSURE: 138 MMHG | DIASTOLIC BLOOD PRESSURE: 80 MMHG

## 2020-07-13 DIAGNOSIS — M25.551 PAIN OF RIGHT HIP JOINT: ICD-10-CM

## 2020-07-13 DIAGNOSIS — F52.21 ED (ERECTILE DYSFUNCTION) OF NON-ORGANIC ORIGIN: ICD-10-CM

## 2020-07-13 DIAGNOSIS — G89.4 CHRONIC PAIN SYNDROME: ICD-10-CM

## 2020-07-13 DIAGNOSIS — M54.41 CHRONIC BILATERAL LOW BACK PAIN WITH RIGHT-SIDED SCIATICA: Primary | ICD-10-CM

## 2020-07-13 DIAGNOSIS — I10 ESSENTIAL HYPERTENSION: ICD-10-CM

## 2020-07-13 DIAGNOSIS — G89.29 CHRONIC BILATERAL LOW BACK PAIN WITH RIGHT-SIDED SCIATICA: Primary | ICD-10-CM

## 2020-07-13 PROCEDURE — 99214 OFFICE O/P EST MOD 30 MIN: CPT | Performed by: FAMILY MEDICINE

## 2020-07-13 RX ORDER — ARIPIPRAZOLE 5 MG/1
5 TABLET ORAL DAILY
Qty: 30 TABLET | Refills: 3 | Status: SHIPPED | OUTPATIENT
Start: 2020-07-13 | End: 2020-09-15 | Stop reason: SDUPTHER

## 2020-07-13 RX ORDER — SILDENAFIL 100 MG/1
100 TABLET, FILM COATED ORAL SEE ADMIN INSTRUCTIONS
Qty: 10 TABLET | Refills: 2 | Status: SHIPPED | OUTPATIENT
Start: 2020-07-13 | End: 2020-08-13 | Stop reason: SDUPTHER

## 2020-07-13 RX ORDER — OXYCODONE AND ACETAMINOPHEN 7.5; 325 MG/1; MG/1
1 TABLET ORAL EVERY 6 HOURS PRN
Qty: 120 TABLET | Refills: 0 | Status: SHIPPED | OUTPATIENT
Start: 2020-07-13 | End: 2020-08-13 | Stop reason: SDUPTHER

## 2020-07-13 RX ORDER — BENZTROPINE MESYLATE 1 MG/1
1 TABLET ORAL DAILY
Qty: 30 TABLET | Refills: 2 | Status: SHIPPED | OUTPATIENT
Start: 2020-07-13 | End: 2020-09-15 | Stop reason: SDUPTHER

## 2020-07-13 RX ORDER — AMLODIPINE BESYLATE 10 MG/1
10 TABLET ORAL DAILY
Qty: 30 TABLET | Refills: 2 | Status: SHIPPED | OUTPATIENT
Start: 2020-07-13 | End: 2020-08-13 | Stop reason: SDUPTHER

## 2020-07-13 RX ORDER — HALOPERIDOL 5 MG/1
5 TABLET ORAL DAILY
Qty: 30 TABLET | Refills: 3 | Status: SHIPPED | OUTPATIENT
Start: 2020-07-13 | End: 2020-09-15 | Stop reason: SDUPTHER

## 2020-07-13 NOTE — PROGRESS NOTES
Subjective   Bogdan Thompson is a 40 y.o. male.   Cc: follow up of chronic medical issues    Hypertension   This is a chronic problem. The current episode started more than 1 year ago. The problem has been gradually improving since onset. Associated symptoms include anxiety and malaise/fatigue. Pertinent negatives include no blurred vision, chest pain, headaches, neck pain, orthopnea, palpitations, peripheral edema, PND, shortness of breath or sweats. Current antihypertension treatment includes calcium channel blockers and diuretics.   Pain   This is a chronic problem. The current episode started more than 1 year ago. The problem occurs daily. Pertinent negatives include no abdominal pain, anorexia, arthralgias, chest pain, chills, fatigue, headaches, myalgias or neck pain. The symptoms are aggravated by standing, twisting and walking.       The following portions of the patient's history were reviewed and updated as appropriate: allergies, current medications, past family history, past medical history, past social history, past surgical history and problem list.    Review of Systems   Constitutional: Positive for malaise/fatigue. Negative for chills and fatigue.   Eyes: Negative for blurred vision.   Respiratory: Negative for shortness of breath.    Cardiovascular: Negative for chest pain, palpitations, orthopnea and PND.   Gastrointestinal: Negative for abdominal pain and anorexia.   Musculoskeletal: Negative for arthralgias, myalgias and neck pain.   Neurological: Negative for headaches.       Objective   Physical Exam   Constitutional: He appears well-developed and well-nourished.   HENT:   Head: Normocephalic and atraumatic.   Right Ear: External ear normal.   Left Ear: External ear normal.   Nose: Nose normal.   Mouth/Throat: Oropharynx is clear and moist.   Eyes: Pupils are equal, round, and reactive to light.   Neck: Normal range of motion.   Cardiovascular: Normal rate, regular rhythm and normal heart sounds.  "Exam reveals no gallop and no friction rub.   No murmur heard.  Pulmonary/Chest: Effort normal and breath sounds normal. No stridor. No respiratory distress. He has no wheezes. He has no rales.   Abdominal: Soft. Bowel sounds are normal.   Musculoskeletal:   His low back is tender in the Lumbar area. The right hip is tender as  Well.   Neurological: He is alert.   Vitals reviewed.        Visit Vitals  /80   Pulse 82   Ht 180.3 cm (71\")   Wt 69.4 kg (153 lb 1 oz)   SpO2 98%   BMI 21.35 kg/m²     Body mass index is 21.35 kg/m².      Assessment/Plan   Bogdan was seen today for follow-up, med refill and hypertension.    Diagnoses and all orders for this visit:    Chronic bilateral low back pain with right-sided sciatica    Pain of right hip joint    ED (erectile dysfunction) of non-organic origin    Essential hypertension    GRAHAM is appropriate-[id number]  Return to the clinic in 3 month/s.  Will contact with results as needed.      "

## 2020-08-13 ENCOUNTER — OFFICE VISIT (OUTPATIENT)
Dept: FAMILY MEDICINE CLINIC | Facility: CLINIC | Age: 40
End: 2020-08-13

## 2020-08-13 ENCOUNTER — LAB (OUTPATIENT)
Dept: LAB | Facility: HOSPITAL | Age: 40
End: 2020-08-13

## 2020-08-13 VITALS
BODY MASS INDEX: 22.32 KG/M2 | DIASTOLIC BLOOD PRESSURE: 94 MMHG | WEIGHT: 159.44 LBS | OXYGEN SATURATION: 99 % | HEIGHT: 71 IN | HEART RATE: 77 BPM | SYSTOLIC BLOOD PRESSURE: 148 MMHG

## 2020-08-13 DIAGNOSIS — G47.00 INSOMNIA, UNSPECIFIED TYPE: ICD-10-CM

## 2020-08-13 DIAGNOSIS — M25.551 PAIN OF RIGHT HIP JOINT: ICD-10-CM

## 2020-08-13 DIAGNOSIS — F52.21 ED (ERECTILE DYSFUNCTION) OF NON-ORGANIC ORIGIN: ICD-10-CM

## 2020-08-13 DIAGNOSIS — G89.4 CHRONIC PAIN SYNDROME: ICD-10-CM

## 2020-08-13 DIAGNOSIS — I10 ESSENTIAL HYPERTENSION: ICD-10-CM

## 2020-08-13 DIAGNOSIS — F33.9 RECURRENT MAJOR DEPRESSIVE DISORDER, REMISSION STATUS UNSPECIFIED (HCC): ICD-10-CM

## 2020-08-13 DIAGNOSIS — M25.551 PAIN OF RIGHT HIP JOINT: Primary | ICD-10-CM

## 2020-08-13 DIAGNOSIS — Z11.59 NEED FOR HEPATITIS C SCREENING TEST: ICD-10-CM

## 2020-08-13 DIAGNOSIS — F31.9 BIPOLAR AFFECTIVE DISORDER, REMISSION STATUS UNSPECIFIED (HCC): ICD-10-CM

## 2020-08-13 DIAGNOSIS — F41.9 ANXIETY: ICD-10-CM

## 2020-08-13 LAB
ALBUMIN SERPL-MCNC: 4.3 G/DL (ref 3.5–5.2)
ALBUMIN/GLOB SERPL: 1.4 G/DL
ALP SERPL-CCNC: 73 U/L (ref 39–117)
ALT SERPL W P-5'-P-CCNC: 14 U/L (ref 1–41)
ANION GAP SERPL CALCULATED.3IONS-SCNC: 8.6 MMOL/L (ref 5–15)
AST SERPL-CCNC: 18 U/L (ref 1–40)
BASOPHILS # BLD AUTO: 0.03 10*3/MM3 (ref 0–0.2)
BASOPHILS NFR BLD AUTO: 0.5 % (ref 0–1.5)
BILIRUB SERPL-MCNC: 0.3 MG/DL (ref 0–1.2)
BUN SERPL-MCNC: 11 MG/DL (ref 6–20)
BUN/CREAT SERPL: 9.6 (ref 7–25)
CALCIUM SPEC-SCNC: 9.7 MG/DL (ref 8.6–10.5)
CHLORIDE SERPL-SCNC: 102 MMOL/L (ref 98–107)
CHOLEST SERPL-MCNC: 222 MG/DL (ref 0–200)
CO2 SERPL-SCNC: 26.4 MMOL/L (ref 22–29)
CREAT SERPL-MCNC: 1.14 MG/DL (ref 0.76–1.27)
DEPRECATED RDW RBC AUTO: 46.4 FL (ref 37–54)
EOSINOPHIL # BLD AUTO: 0.29 10*3/MM3 (ref 0–0.4)
EOSINOPHIL NFR BLD AUTO: 5 % (ref 0.3–6.2)
ERYTHROCYTE [DISTWIDTH] IN BLOOD BY AUTOMATED COUNT: 13.4 % (ref 12.3–15.4)
GFR SERPL CREATININE-BSD FRML MDRD: 85 ML/MIN/1.73
GLOBULIN UR ELPH-MCNC: 3 GM/DL
GLUCOSE SERPL-MCNC: 97 MG/DL (ref 65–99)
HCT VFR BLD AUTO: 42.1 % (ref 37.5–51)
HCV AB SER DONR QL: NORMAL
HDLC SERPL-MCNC: 74 MG/DL (ref 40–60)
HGB BLD-MCNC: 15.1 G/DL (ref 13–17.7)
IMM GRANULOCYTES # BLD AUTO: 0.03 10*3/MM3 (ref 0–0.05)
IMM GRANULOCYTES NFR BLD AUTO: 0.5 % (ref 0–0.5)
LDLC SERPL CALC-MCNC: 140 MG/DL (ref 0–100)
LDLC/HDLC SERPL: 1.89 {RATIO}
LYMPHOCYTES # BLD AUTO: 1.57 10*3/MM3 (ref 0.7–3.1)
LYMPHOCYTES NFR BLD AUTO: 27.1 % (ref 19.6–45.3)
MCH RBC QN AUTO: 33.9 PG (ref 26.6–33)
MCHC RBC AUTO-ENTMCNC: 35.9 G/DL (ref 31.5–35.7)
MCV RBC AUTO: 94.6 FL (ref 79–97)
MONOCYTES # BLD AUTO: 0.44 10*3/MM3 (ref 0.1–0.9)
MONOCYTES NFR BLD AUTO: 7.6 % (ref 5–12)
NEUTROPHILS NFR BLD AUTO: 3.44 10*3/MM3 (ref 1.7–7)
NEUTROPHILS NFR BLD AUTO: 59.3 % (ref 42.7–76)
NRBC BLD AUTO-RTO: 0.2 /100 WBC (ref 0–0.2)
PLATELET # BLD AUTO: 231 10*3/MM3 (ref 140–450)
PMV BLD AUTO: 10.9 FL (ref 6–12)
POTASSIUM SERPL-SCNC: 4.3 MMOL/L (ref 3.5–5.2)
PROT SERPL-MCNC: 7.3 G/DL (ref 6–8.5)
RBC # BLD AUTO: 4.45 10*6/MM3 (ref 4.14–5.8)
SODIUM SERPL-SCNC: 137 MMOL/L (ref 136–145)
TRIGL SERPL-MCNC: 42 MG/DL (ref 0–150)
VLDLC SERPL-MCNC: 8.4 MG/DL (ref 5–40)
WBC # BLD AUTO: 5.8 10*3/MM3 (ref 3.4–10.8)

## 2020-08-13 PROCEDURE — G0481 DRUG TEST DEF 8-14 CLASSES: HCPCS

## 2020-08-13 PROCEDURE — 99214 OFFICE O/P EST MOD 30 MIN: CPT | Performed by: FAMILY MEDICINE

## 2020-08-13 PROCEDURE — 86803 HEPATITIS C AB TEST: CPT

## 2020-08-13 PROCEDURE — 80053 COMPREHEN METABOLIC PANEL: CPT

## 2020-08-13 PROCEDURE — 85025 COMPLETE CBC W/AUTO DIFF WBC: CPT

## 2020-08-13 PROCEDURE — 36415 COLL VENOUS BLD VENIPUNCTURE: CPT

## 2020-08-13 PROCEDURE — 80307 DRUG TEST PRSMV CHEM ANLYZR: CPT

## 2020-08-13 PROCEDURE — 80061 LIPID PANEL: CPT

## 2020-08-13 RX ORDER — HYDROCHLOROTHIAZIDE 25 MG/1
25 TABLET ORAL DAILY
Qty: 30 TABLET | Refills: 2 | Status: SHIPPED | OUTPATIENT
Start: 2020-08-13 | End: 2020-09-15 | Stop reason: SDUPTHER

## 2020-08-13 RX ORDER — AMLODIPINE BESYLATE 10 MG/1
10 TABLET ORAL DAILY
Qty: 30 TABLET | Refills: 2 | Status: SHIPPED | OUTPATIENT
Start: 2020-08-13 | End: 2021-01-11 | Stop reason: SDUPTHER

## 2020-08-13 RX ORDER — SILDENAFIL 100 MG/1
100 TABLET, FILM COATED ORAL SEE ADMIN INSTRUCTIONS
Qty: 10 TABLET | Refills: 2 | Status: SHIPPED | OUTPATIENT
Start: 2020-08-13

## 2020-08-13 RX ORDER — OXYCODONE AND ACETAMINOPHEN 7.5; 325 MG/1; MG/1
1 TABLET ORAL EVERY 6 HOURS PRN
Qty: 120 TABLET | Refills: 0 | Status: SHIPPED | OUTPATIENT
Start: 2020-08-13 | End: 2020-09-15

## 2020-08-13 NOTE — PROGRESS NOTES
Subjective   Bogdan Thompson is a 40 y.o. male.   Cc: follow up of chronic medical issues    His Erectile Dysfunction is at his base line.  Back Pain   This is a chronic problem. The current episode started more than 1 year ago. The problem occurs daily. The pain is present in the lumbar spine. The pain radiates to the right foot. The pain is at a severity of 10/10. The pain is moderate. Associated symptoms include leg pain. Pertinent negatives include no abdominal pain, bladder incontinence, bowel incontinence, chest pain, dysuria, tingling or weakness.   Depression   Visit Type: follow-up  Patient presents with the following symptoms: nervousness/anxiety, psychomotor agitation and restlessness.  Patient is not experiencing: anhedonia, chest pain, choking sensation, compulsions, confusion, decreased concentration, depressed mood, dizziness, dry mouth, excessive worry, feelings of worthlessness, hypersomnia, insomnia, irritability, memory impairment, obsessions, palpitations, panic, shortness of breath and suicidal ideas.    Anxiety   Presents for follow-up visit. Symptoms include nervous/anxious behavior and restlessness. Patient reports no chest pain, compulsions, confusion, decreased concentration, depressed mood, dizziness, dry mouth, excessive worry, insomnia, irritability, obsessions, palpitations, panic, shortness of breath or suicidal ideas.     His past medical history is significant for depression.       The following portions of the patient's history were reviewed and updated as appropriate: allergies, current medications, past family history, past medical history, past social history, past surgical history and problem list.    Review of Systems   Constitutional: Negative for irritability.   Respiratory: Negative for choking and shortness of breath.    Cardiovascular: Negative for chest pain and palpitations.   Gastrointestinal: Negative for abdominal pain and bowel incontinence.   Genitourinary: Negative for  "bladder incontinence and dysuria.   Musculoskeletal: Positive for back pain.   Neurological: Negative for dizziness, tingling and weakness.   Psychiatric/Behavioral: Negative for confusion, decreased concentration and suicidal ideas. The patient is nervous/anxious. The patient does not have insomnia.        Objective   Physical Exam   Constitutional: He appears well-developed and well-nourished.   HENT:   Head: Normocephalic and atraumatic.   Right Ear: External ear normal.   Left Ear: External ear normal.   Nose: Nose normal.   Mouth/Throat: Oropharynx is clear and moist.   Eyes: Pupils are equal, round, and reactive to light.   Neck: Normal range of motion.   Cardiovascular: Normal rate, regular rhythm and normal heart sounds. Exam reveals no gallop and no friction rub.   No murmur heard.  Pulmonary/Chest: Effort normal and breath sounds normal. No stridor. No respiratory distress. He has no wheezes. He has no rales.   Abdominal: Soft. Bowel sounds are normal. He exhibits no distension. There is no tenderness.   Musculoskeletal:   Tenderness in the right hip and Lumbar area on palpation.Fair ROM of the right hip.   Neurological: He is alert.   Skin: Skin is warm and dry.   Vitals reviewed.        Visit Vitals  /94   Pulse 77   Ht 180.3 cm (71\")   Wt 72.3 kg (159 lb 7 oz)   SpO2 99%   BMI 22.24 kg/m²     Body mass index is 22.24 kg/m².      Assessment/Plan   Bogdan was seen today for follow-up, back pain and med refill.    Diagnoses and all orders for this visit:    Pain of right hip joint  -     ToxASSURE Select 13 Discrete - Urine, Clean Catch; Future    Chronic pain syndrome  -     oxyCODONE-acetaminophen (Percocet) 7.5-325 MG per tablet; Take 1 tablet by mouth Every 6 (Six) Hours As Needed (pain).  -     ToxASSURE Select 13 Discrete - Urine, Clean Catch; Future    Essential hypertension  -     Lipid Panel; Future  -     Comprehensive Metabolic Panel; Future  -     CBC w AUTO Differential; Future    ED " (erectile dysfunction) of non-organic origin  -     Lipid Panel; Future  -     Comprehensive Metabolic Panel; Future  -     CBC w AUTO Differential; Future    Insomnia, unspecified type  -     Lipid Panel; Future  -     Comprehensive Metabolic Panel; Future  -     CBC w AUTO Differential; Future    Recurrent major depressive disorder, remission status unspecified (CMS/HCC)  -     Lipid Panel; Future  -     Comprehensive Metabolic Panel; Future  -     CBC w AUTO Differential; Future    Anxiety  -     Lipid Panel; Future  -     Comprehensive Metabolic Panel; Future  -     CBC w AUTO Differential; Future    Need for hepatitis C screening test  -     Hepatitis C antibody; Future    Bipolar affective disorder, remission status unspecified (CMS/HCC)  -     Lipid Panel; Future  -     Comprehensive Metabolic Panel; Future  -     CBC w AUTO Differential; Future    Other orders  -     amLODIPine (NORVASC) 10 MG tablet; Take 1 tablet by mouth Daily.  -     hydroCHLOROthiazide (HYDRODIURIL) 25 MG tablet; Take 1 tablet by mouth Daily.  -     sildenafil (VIAGRA) 100 MG tablet; Take 1 tablet by mouth See Admin Instructions.    GRAHAM is appropriate-[id number]  Return to the clinic in 1 month/s.  Will contact with results as needed.

## 2020-08-14 ENCOUNTER — TELEPHONE (OUTPATIENT)
Dept: FAMILY MEDICINE CLINIC | Facility: CLINIC | Age: 40
End: 2020-08-14

## 2020-08-14 NOTE — TELEPHONE ENCOUNTER
Lucía-Pharmacist at [facility] called stating that Mr Donna called to inform them that he was shorted 6 pills of his percocet 7.5mg rx and then called back and stated he was shorted 10 pills. Pharmacy has 2 people who count these out and they believe he was given the correct amount on 8-. The told Mr Thompson that would contact our office to see if you would write a new Rx for the additional pills or authorize him to fill his rx early next month. Please call pharmacy to advise. Thank you

## 2020-08-18 LAB
6-ACETYLMORPHINE: NEGATIVE
6MAM SERPLBLD-MCNC: NOT DETECTED NG/MG CREAT
7-AMINOCLONAZEPAM UR: NOT DETECTED NG/MG CREAT
A-OH ALPRAZ/CREAT UR: NOT DETECTED NG/MG CREAT
ALFENTANIL UR QL: NOT DETECTED NG/MG CREAT
ALPHA-HYDROXYMIDAZOLAM, URINE: NOT DETECTED NG/MG CREAT
ALPHA-HYDROXYTRIAZOLAM, URINE: NOT DETECTED NG/MG CREAT
AMOBARBITAL UR: NOT DETECTED
AMPHET UR QL CFM: NOT DETECTED NG/MG CREAT
AMPHETAMINES UR QL SCN: NEGATIVE
BARBITAL UR QL CFM: NOT DETECTED
BARBITURATES UR QL SCN: NEGATIVE
BENZODIAZ UR QL SCN: NEGATIVE
BENZOYLECGONINE UR: NOT DETECTED NG/MG CREAT
BUPRENORPHINE UR QL: NEGATIVE
BUPRENORPHINE UR QL: NOT DETECTED NG/MG CREAT
BUTABARBITAL UR QL: NOT DETECTED
BUTALBITAL UR QL: NOT DETECTED
CANNABINOIDS UR QL CFM: NEGATIVE
CLONAZEPAM UR QL: NOT DETECTED NG/MG CREAT
COCAETHYLENE UR QL CFM: NOT DETECTED NG/MG CREAT
COCAINE UR QL CFM: NEGATIVE
CODEINE UR QL: NOT DETECTED NG/MG CREAT
CONV COCAINE, UR: NOT DETECTED NG/MG CREAT
CONV REPORT SUMMARY: NORMAL
CREAT 24H UR-MCNC: 139 MG/DL
DESALKYLFLURAZ/CREAT UR: NOT DETECTED NG/MG CREAT
DESMETHYLFLUNITRAZEPAM: NOT DETECTED NG/MG CREAT
DIAZEPAM UR-MCNC: NOT DETECTED NG/MG CREAT
DIHYDROCODEINE UR: NOT DETECTED NG/MG CREAT
EDDP SERPL QL: NOT DETECTED NG/MG CREAT
ETHANOL SCREEN URINE (REF): NEGATIVE
ETHANOL UR-MCNC: NOT DETECTED G/DL
FENTANYL UR QL: NEGATIVE
FENTANYL+NORFENTANYL UR QL SCN: NOT DETECTED NG/MG CREAT
FLUNITRAZEPAM SERPLBLD-MCNC: NOT DETECTED NG/MG CREAT
HYDROCODONE UR QL: NOT DETECTED NG/MG CREAT
HYDROMORPHONE UR QL: NOT DETECTED NG/MG CREAT
LEVEL OF DETECTION:: NORMAL
LORAZEPAM UR-MCNC: NOT DETECTED NG/MG CREAT
LORAZEPAM/CREAT UR: NOT DETECTED NG/MG CREAT
MDA SERPLBLD-MCNC: NOT DETECTED NG/MG CREAT
MDMA UR QL SCN: NOT DETECTED NG/MG CREAT
MEPHOBARBITAL UR QL CFM: NOT DETECTED
METHADONE BLD QL SCN: NEGATIVE
METHADONE, URINE: NOT DETECTED NG/MG CREAT
METHAMPHETAMINE UR: NOT DETECTED NG/MG CREAT
MIDAZOLAM UR-MCNC: NOT DETECTED NG/MG CREAT
MORPHINE UR QL: NOT DETECTED NG/MG CREAT
N-DESMETHYLTRAMADOL, U: NOT DETECTED NG/MG CREAT
NARCOTICS UR: NEGATIVE
NORBUPRENORPHINE SERPLBLD-MCNC: NOT DETECTED NG/MG CREAT
NORCODEINE UR-MCNC: NOT DETECTED NG/MG CREAT
NORDIAZEPAM SERPL CFM-MCNC: NOT DETECTED NG/MG CREAT
NORFENTANYL UR: NOT DETECTED NG/MG CREAT
NORMORPHINE: NOT DETECTED NG/MG CREAT
NOROXYMORPHONE: NOT DETECTED NG/MG CREAT
O-DESMETHYLTRAMADOL, UR: NOT DETECTED NG/MG CREAT
OPIATES UR QL CFM: NEGATIVE
OPIATES, URINE, NORHYDROCODONE: NOT DETECTED NG/MG CREAT
OPIATES, URINE, NOROXYCODONE: NOT DETECTED NG/MG CREAT
OXAZEPAM UR QL: NOT DETECTED NG/MG CREAT
OXYCODONE UR QL: NEGATIVE
OXYCODONE UR: NOT DETECTED NG/MG CREAT
OXYMORPHONE UR: NOT DETECTED NG/MG CREAT
PENTOBARBITAL UR: NOT DETECTED
PHENOBARB UR QL: NOT DETECTED
SECOBARBITAL UR QL: NOT DETECTED
SUFENTANIL UR QL: NOT DETECTED NG/MG CREAT
TAPENTADOL SERPLBLD-MCNC: NEGATIVE NG/ML
TAPENTADOL UR-MCNC: NOT DETECTED NG/MG CREAT
TEMAZEPAM UR QL CFM: NOT DETECTED NG/MG CREAT
THC UR CFM-MCNC: NOT DETECTED NG/MG CREAT
THIOPENTAL UR QL CFM: NOT DETECTED
TRAMADOL UR: NOT DETECTED NG/MG CREAT

## 2020-08-20 ENCOUNTER — TELEPHONE (OUTPATIENT)
Dept: FAMILY MEDICINE CLINIC | Facility: CLINIC | Age: 40
End: 2020-08-20

## 2020-09-15 ENCOUNTER — LAB (OUTPATIENT)
Dept: LAB | Facility: HOSPITAL | Age: 40
End: 2020-09-15

## 2020-09-15 ENCOUNTER — OFFICE VISIT (OUTPATIENT)
Dept: FAMILY MEDICINE CLINIC | Facility: CLINIC | Age: 40
End: 2020-09-15

## 2020-09-15 VITALS
SYSTOLIC BLOOD PRESSURE: 144 MMHG | BODY MASS INDEX: 21.46 KG/M2 | HEART RATE: 82 BPM | HEIGHT: 71 IN | OXYGEN SATURATION: 99 % | WEIGHT: 153.31 LBS | DIASTOLIC BLOOD PRESSURE: 80 MMHG

## 2020-09-15 DIAGNOSIS — R89.2 ABNORMAL DRUG SCREEN: ICD-10-CM

## 2020-09-15 DIAGNOSIS — Z79.891 LONG TERM PRESCRIPTION OPIATE USE: ICD-10-CM

## 2020-09-15 DIAGNOSIS — G89.29 OTHER CHRONIC PAIN: ICD-10-CM

## 2020-09-15 DIAGNOSIS — F41.9 ANXIETY: ICD-10-CM

## 2020-09-15 DIAGNOSIS — Z79.891 LONG TERM PRESCRIPTION OPIATE USE: Primary | ICD-10-CM

## 2020-09-15 LAB
AMPHET+METHAMPHET UR QL: NEGATIVE
AMPHETAMINES UR QL: NEGATIVE
BARBITURATES UR QL SCN: NEGATIVE
BENZODIAZ UR QL SCN: NEGATIVE
BUPRENORPHINE SERPL-MCNC: NEGATIVE NG/ML
CANNABINOIDS SERPL QL: NEGATIVE
COCAINE UR QL: NEGATIVE
METHADONE UR QL SCN: NEGATIVE
OPIATES UR QL: NEGATIVE
OXYCODONE UR QL SCN: NEGATIVE
PCP UR QL SCN: NEGATIVE
PROPOXYPH UR QL: NEGATIVE
TRICYCLICS UR QL SCN: NEGATIVE

## 2020-09-15 PROCEDURE — 80306 DRUG TEST PRSMV INSTRMNT: CPT

## 2020-09-15 PROCEDURE — 99214 OFFICE O/P EST MOD 30 MIN: CPT | Performed by: FAMILY MEDICINE

## 2020-09-15 RX ORDER — ARIPIPRAZOLE 5 MG/1
5 TABLET ORAL DAILY
Qty: 30 TABLET | Refills: 3 | Status: SHIPPED | OUTPATIENT
Start: 2020-09-15

## 2020-09-15 RX ORDER — BENZTROPINE MESYLATE 1 MG/1
1 TABLET ORAL DAILY
Qty: 30 TABLET | Refills: 2 | Status: SHIPPED | OUTPATIENT
Start: 2020-09-15

## 2020-09-15 RX ORDER — HALOPERIDOL 5 MG/1
5 TABLET ORAL DAILY
Qty: 30 TABLET | Refills: 3 | Status: SHIPPED | OUTPATIENT
Start: 2020-09-15

## 2020-09-15 RX ORDER — HYDROCHLOROTHIAZIDE 25 MG/1
25 TABLET ORAL DAILY
Qty: 30 TABLET | Refills: 2 | Status: SHIPPED | OUTPATIENT
Start: 2020-09-15 | End: 2021-01-11 | Stop reason: SDUPTHER

## 2020-09-15 RX ORDER — VENLAFAXINE 75 MG/1
TABLET ORAL
COMMUNITY
Start: 2020-07-24

## 2020-09-15 RX ORDER — PROPRANOLOL HYDROCHLORIDE 10 MG/1
10 TABLET ORAL 2 TIMES DAILY
Qty: 60 TABLET | Refills: 4 | Status: SHIPPED | OUTPATIENT
Start: 2020-09-15 | End: 2021-01-11 | Stop reason: SDUPTHER

## 2020-09-15 RX ORDER — OXYCODONE AND ACETAMINOPHEN 7.5; 325 MG/1; MG/1
1 TABLET ORAL EVERY 6 HOURS PRN
Qty: 120 TABLET | Refills: 0 | Status: SHIPPED | OUTPATIENT
Start: 2020-09-15

## 2020-09-15 RX ORDER — QUETIAPINE FUMARATE 200 MG/1
200 TABLET, FILM COATED ORAL NIGHTLY
Qty: 30 TABLET | Refills: 5 | Status: SHIPPED | OUTPATIENT
Start: 2020-09-15

## 2020-09-15 RX ORDER — METHOCARBAMOL 500 MG/1
500 TABLET, FILM COATED ORAL 4 TIMES DAILY
Qty: 56 TABLET | Refills: 1 | Status: SHIPPED | OUTPATIENT
Start: 2020-09-15 | End: 2021-01-11 | Stop reason: SDUPTHER

## 2020-09-15 NOTE — PROGRESS NOTES
Subjective   Bogdan Thompson is a 40 y.o. male.   Cc: follow up of chronic medical issues    Pain  This is a chronic problem. The current episode started more than 1 year ago. The problem occurs daily. The problem has been unchanged. Pertinent negatives include no abdominal pain, anorexia, arthralgias, change in bowel habit, chest pain, congestion, coughing, diaphoresis, fatigue, headaches, joint swelling, nausea, neck pain, sore throat, urinary symptoms, vomiting or weakness.   Anxiety  Presents for follow-up visit. Symptoms include excessive worry and nervous/anxious behavior. Patient reports no chest pain, compulsions, decreased concentration, depressed mood, dizziness, feeling of choking, insomnia, irritability, malaise, nausea, obsessions, palpitations, panic, shortness of breath or suicidal ideas.       Hypertension  This is a chronic problem. The current episode started more than 1 year ago. The problem has been gradually improving since onset. Associated symptoms include anxiety. Pertinent negatives include no blurred vision, chest pain, headaches, malaise/fatigue, neck pain, orthopnea, palpitations, peripheral edema, PND, shortness of breath or sweats.   Have discussed the fact that his Drug screen is always negative. He states that he has taken his last pill about a week ago. I have discussed how that is a problem. I will be making referral to pain management.  The following portions of the patient's history were reviewed and updated as appropriate: allergies, current medications, past family history, past medical history, past social history, past surgical history and problem list.    Review of Systems   Constitutional: Negative for diaphoresis, fatigue, irritability and malaise/fatigue.   HENT: Negative for congestion and sore throat.    Eyes: Negative for blurred vision.   Respiratory: Negative for cough and shortness of breath.    Cardiovascular: Negative for chest pain, palpitations, orthopnea and PND.  "  Gastrointestinal: Negative for abdominal pain, anorexia, change in bowel habit, nausea and vomiting.   Musculoskeletal: Negative for arthralgias, joint swelling and neck pain.   Neurological: Negative for dizziness, weakness and headaches.   Psychiatric/Behavioral: Negative for decreased concentration and suicidal ideas. The patient is nervous/anxious. The patient does not have insomnia.        Objective   Physical Exam  Vitals signs reviewed.   Constitutional:       Appearance: Normal appearance.   HENT:      Head: Normocephalic and atraumatic.      Right Ear: External ear normal.      Left Ear: External ear normal.      Nose: Nose normal.      Mouth/Throat:      Mouth: Mucous membranes are moist.      Pharynx: Oropharynx is clear.   Eyes:      Pupils: Pupils are equal, round, and reactive to light.   Neck:      Musculoskeletal: Normal range of motion.   Cardiovascular:      Rate and Rhythm: Normal rate and regular rhythm.      Pulses: Normal pulses.      Heart sounds: No murmur. No friction rub. No gallop.    Pulmonary:      Effort: Pulmonary effort is normal. No respiratory distress.      Breath sounds: Normal breath sounds. No stridor. No wheezing, rhonchi or rales.   Chest:      Chest wall: No tenderness.   Abdominal:      General: Abdomen is flat. There is no distension.      Palpations: Abdomen is soft.      Tenderness: There is no abdominal tenderness.   Musculoskeletal:      Comments: Low back  is tender on palpation.   Skin:     General: Skin is warm and dry.   Neurological:      Mental Status: He is alert.           Visit Vitals  /80   Pulse 82   Ht 180.3 cm (71\")   Wt 69.5 kg (153 lb 5 oz)   SpO2 99%   BMI 21.38 kg/m²     Body mass index is 21.38 kg/m².      Assessment/Plan   Bogdan was seen today for follow-up, hip pain and med refill.    Diagnoses and all orders for this visit:    Long term prescription opiate use  -     Urine Drug Screen - Urine, Clean Catch; Future    Abnormal drug screen  -   "   Urine Drug Screen - Urine, Clean Catch; Future    Anxiety    Other chronic pain  -     Ambulatory Referral to Pain Management  -     oxyCODONE-acetaminophen (Percocet) 7.5-325 MG per tablet; Take 1 tablet by mouth Every 6 (Six) Hours As Needed (pain).    Other orders  -     ARIPiprazole (ABILIFY) 5 MG tablet; Take 1 tablet by mouth Daily.  -     benztropine (COGENTIN) 1 MG tablet; Take 1 tablet by mouth Daily.  -     haloperidol (HALDOL) 5 MG tablet; Take 1 tablet by mouth Daily.  -     hydroCHLOROthiazide (HYDRODIURIL) 25 MG tablet; Take 1 tablet by mouth Daily.  -     propranolol (INDERAL) 10 MG tablet; Take 1 tablet by mouth 2 (Two) Times a Day.  -     QUEtiapine (SEROquel) 200 MG tablet; Take 1 tablet by mouth Every Night.  -     methocarbamol (Robaxin) 500 MG tablet; Take 1 tablet by mouth 4 (Four) Times a Day.    Return to the clinic in 1 month/s.  Will contact with results as needed.  Continue on his chronic medications.I have discussed with the patient that his having a negative drug screen is an issue and I have made a referral to pain management.

## 2020-09-21 ENCOUNTER — TELEPHONE (OUTPATIENT)
Dept: FAMILY MEDICINE CLINIC | Facility: CLINIC | Age: 40
End: 2020-09-21

## 2020-09-21 NOTE — TELEPHONE ENCOUNTER
[facility] called with a FYI on Mr Thompson. He was a no call/no show for his appointment today.

## 2021-01-11 ENCOUNTER — OFFICE VISIT (OUTPATIENT)
Dept: FAMILY MEDICINE CLINIC | Facility: CLINIC | Age: 41
End: 2021-01-11

## 2021-01-11 VITALS
HEART RATE: 83 BPM | WEIGHT: 148 LBS | BODY MASS INDEX: 21.19 KG/M2 | DIASTOLIC BLOOD PRESSURE: 88 MMHG | OXYGEN SATURATION: 99 % | HEIGHT: 70 IN | RESPIRATION RATE: 18 BRPM | SYSTOLIC BLOOD PRESSURE: 148 MMHG

## 2021-01-11 DIAGNOSIS — E87.6 LOW SERUM POTASSIUM: ICD-10-CM

## 2021-01-11 DIAGNOSIS — M54.50 CHRONIC MIDLINE LOW BACK PAIN, UNSPECIFIED WHETHER SCIATICA PRESENT: ICD-10-CM

## 2021-01-11 DIAGNOSIS — F31.9 BIPOLAR AFFECTIVE DISORDER, REMISSION STATUS UNSPECIFIED (HCC): ICD-10-CM

## 2021-01-11 DIAGNOSIS — G89.4 CHRONIC PAIN SYNDROME: ICD-10-CM

## 2021-01-11 DIAGNOSIS — G89.29 CHRONIC MIDLINE LOW BACK PAIN, UNSPECIFIED WHETHER SCIATICA PRESENT: ICD-10-CM

## 2021-01-11 DIAGNOSIS — I10 ESSENTIAL HYPERTENSION: Primary | ICD-10-CM

## 2021-01-11 PROCEDURE — 99214 OFFICE O/P EST MOD 30 MIN: CPT | Performed by: STUDENT IN AN ORGANIZED HEALTH CARE EDUCATION/TRAINING PROGRAM

## 2021-01-11 RX ORDER — PROPRANOLOL HYDROCHLORIDE 10 MG/1
10 TABLET ORAL 2 TIMES DAILY
Qty: 60 TABLET | Refills: 4 | Status: SHIPPED | OUTPATIENT
Start: 2021-01-11 | End: 2021-06-08 | Stop reason: SDUPTHER

## 2021-01-11 RX ORDER — AMLODIPINE BESYLATE 10 MG/1
10 TABLET ORAL DAILY
Qty: 30 TABLET | Refills: 2 | Status: SHIPPED | OUTPATIENT
Start: 2021-01-11 | End: 2021-06-08 | Stop reason: SDUPTHER

## 2021-01-11 RX ORDER — METHOCARBAMOL 500 MG/1
500 TABLET, FILM COATED ORAL 4 TIMES DAILY
Qty: 56 TABLET | Refills: 1 | Status: SHIPPED | OUTPATIENT
Start: 2021-01-11 | End: 2021-06-08 | Stop reason: SDUPTHER

## 2021-01-11 RX ORDER — POTASSIUM CHLORIDE 20 MEQ/1
20 TABLET, EXTENDED RELEASE ORAL DAILY
Qty: 30 TABLET | Refills: 4 | Status: SHIPPED | OUTPATIENT
Start: 2021-01-11 | End: 2021-06-08 | Stop reason: SDUPTHER

## 2021-01-11 RX ORDER — HYDROCHLOROTHIAZIDE 25 MG/1
25 TABLET ORAL DAILY
Qty: 30 TABLET | Refills: 2 | Status: SHIPPED | OUTPATIENT
Start: 2021-01-11 | End: 2021-06-08 | Stop reason: SDUPTHER

## 2021-01-11 NOTE — PROGRESS NOTES
"   Subjective:  Bogdan Thompson is a 40 y.o. male who presents for establish care and pain management     Pain management; states sees Dr. Campos in [place] but would like something closer to home; Has chronic pain 2/2 right hip fracture;  Pain is 6/10 with the medication; currently taking Norco 7.5mg QID; denies illicit drug use, or history of addiction.  Tolerating medication well, denies constipation.    HTN; is on amlodipine 10mg, and HCTZ 25mg, ran out of medication, tolerating it well, no adverse effect, no lower extremity edema; does not check BP at home, states it is always high when he checks it at doctors office; denies numbness, tingling, headaches, vision changes, nausea, vomiting, abdominal pain.    Bipolar disorder; sees michelle; is on Abilify 5mg daily, Seroquel 200mg at night, Venlafaxine 75mg, and Haldol 5mg; states has been out of the medication for a month; States that he has been doing fine for the past month without the medication. Has been sleeping well, approximately 8 hours every night; Denies feelings of grandeur, decrease need for sleep, history of mental health hospitalizations. States has some derogatory auditory hallucinations but has never gone away, even with medical treatment.  Is still seeing a therapist.       Patient Active Problem List   Diagnosis   • Pain of right hip joint   • Chronic bilateral low back pain with right-sided sciatica   • Insomnia   • Hypertension   • Anxiety   • ED (erectile dysfunction) of non-organic origin   • Chronic low back pain     Vitals:    Vitals:    01/11/21 1042   BP: 148/88   Pulse: 83   Resp: 18   SpO2: 99%   Weight: 67.1 kg (148 lb)   Height: 177.8 cm (70\")       Body mass index is 21.24 kg/m².      Current Outpatient Medications:   •  albuterol sulfate  (90 Base) MCG/ACT inhaler, Inhale 2 puffs Every 4 (Four) Hours As Needed for Wheezing., Disp: 1 inhaler, Rfl: 1  •  amLODIPine (NORVASC) 10 MG tablet, Take 1 tablet by mouth Daily., Disp: " 30 tablet, Rfl: 2  •  ARIPiprazole (ABILIFY) 5 MG tablet, Take 1 tablet by mouth Daily., Disp: 30 tablet, Rfl: 3  •  benztropine (COGENTIN) 1 MG tablet, Take 1 tablet by mouth Daily., Disp: 30 tablet, Rfl: 2  •  busPIRone (BUSPAR) 15 MG tablet, Take 1 tablet by mouth Daily., Disp: , Rfl:   •  cyproheptadine (PERIACTIN) 4 MG tablet, Take 1 tablet by mouth Daily., Disp: , Rfl:   •  gabapentin (NEURONTIN) 300 MG capsule, Take 1 capsule by mouth 3 (Three) Times a Day., Disp: , Rfl:   •  haloperidol (HALDOL) 5 MG tablet, Take 1 tablet by mouth Daily., Disp: 30 tablet, Rfl: 3  •  hydroCHLOROthiazide (HYDRODIURIL) 25 MG tablet, Take 1 tablet by mouth Daily., Disp: 30 tablet, Rfl: 2  •  methocarbamol (Robaxin) 500 MG tablet, Take 1 tablet by mouth 4 (Four) Times a Day., Disp: 56 tablet, Rfl: 1  •  oxyCODONE-acetaminophen (Percocet) 7.5-325 MG per tablet, Take 1 tablet by mouth Every 6 (Six) Hours As Needed (pain)., Disp: 120 tablet, Rfl: 0  •  potassium chloride (K-DUR,KLOR-CON) 20 MEQ CR tablet, Take 1 tablet by mouth Daily., Disp: 30 tablet, Rfl: 4  •  propranolol (INDERAL) 10 MG tablet, Take 1 tablet by mouth 2 (Two) Times a Day., Disp: 60 tablet, Rfl: 4  •  QUEtiapine (SEROquel) 200 MG tablet, Take 1 tablet by mouth Every Night., Disp: 30 tablet, Rfl: 5  •  sildenafil (VIAGRA) 100 MG tablet, Take 1 tablet by mouth See Admin Instructions., Disp: 10 tablet, Rfl: 2  •  venlafaxine (EFFEXOR) 75 MG tablet, , Disp: , Rfl:      Review of Systems  Review of Systems   Constitutional: Negative for chills and fever.   HENT: Negative for congestion and sore throat.    Eyes: Negative for pain and visual disturbance.   Respiratory: Negative for cough and shortness of breath.    Cardiovascular: Negative for chest pain and palpitations.   Gastrointestinal: Negative for nausea and vomiting.   Endocrine: Negative for polydipsia and polyuria.   Genitourinary: Negative for dysuria and hematuria.   Musculoskeletal: Positive for arthralgias  and myalgias.   Skin: Negative for rash and wound.   Neurological: Negative for dizziness and headaches.   Psychiatric/Behavioral: Negative for behavioral problems and confusion.       Patient Active Problem List   Diagnosis   • Pain of right hip joint   • Chronic bilateral low back pain with right-sided sciatica   • Insomnia   • Hypertension   • Anxiety   • ED (erectile dysfunction) of non-organic origin   • Chronic low back pain     Past Surgical History:   Procedure Laterality Date   • HIP FRACTURE SURGERY Right 11/21/2017     Social History     Socioeconomic History   • Marital status: Single     Spouse name: Not on file   • Number of children: Not on file   • Years of education: Not on file   • Highest education level: Not on file   Tobacco Use   • Smoking status: Current Every Day Smoker     Packs/day: 0.50     Types: Cigarettes   • Smokeless tobacco: Never Used   Substance and Sexual Activity   • Alcohol use: No     Frequency: Never   • Drug use: No   • Sexual activity: Yes     Family History   Problem Relation Age of Onset   • Hypertension Brother      Lab on 09/15/2020   Component Date Value Ref Range Status   • THC, Screen, Urine 09/15/2020 Negative  Negative Final   • Phencyclidine (PCP), Urine 09/15/2020 Negative  Negative Final   • Cocaine Screen, Urine 09/15/2020 Negative  Negative Final   • Methamphetamine, Ur 09/15/2020 Negative  Negative Final   • Opiate Screen 09/15/2020 Negative  Negative Final   • Amphetamine Screen, Urine 09/15/2020 Negative  Negative Final   • Benzodiazepine Screen, Urine 09/15/2020 Negative  Negative Final   • Tricyclic Antidepressants Screen 09/15/2020 Negative  Negative Final   • Methadone Screen, Urine 09/15/2020 Negative  Negative Final   • Barbiturates Screen, Urine 09/15/2020 Negative  Negative Final   • Oxycodone Screen, Urine 09/15/2020 Negative  Negative Final   • Propoxyphene Screen 09/15/2020 Negative  Negative Final   • Buprenorphine, Screen, Urine 09/15/2020  Negative  Negative Final   Lab on 08/13/2020   Component Date Value Ref Range Status   • Total Cholesterol 08/13/2020 222* 0 - 200 mg/dL Final   • Triglycerides 08/13/2020 42  0 - 150 mg/dL Final   • HDL Cholesterol 08/13/2020 74* 40 - 60 mg/dL Final   • LDL Cholesterol  08/13/2020 140* 0 - 100 mg/dL Final   • VLDL Cholesterol 08/13/2020 8.4  5 - 40 mg/dL Final   • LDL/HDL Ratio 08/13/2020 1.89   Final   • Glucose 08/13/2020 97  65 - 99 mg/dL Final   • BUN 08/13/2020 11  6 - 20 mg/dL Final   • Creatinine 08/13/2020 1.14  0.76 - 1.27 mg/dL Final   • Sodium 08/13/2020 137  136 - 145 mmol/L Final   • Potassium 08/13/2020 4.3  3.5 - 5.2 mmol/L Final    Specimen hemolyzed.  Results may be affected.   • Chloride 08/13/2020 102  98 - 107 mmol/L Final   • CO2 08/13/2020 26.4  22.0 - 29.0 mmol/L Final   • Calcium 08/13/2020 9.7  8.6 - 10.5 mg/dL Final   • Total Protein 08/13/2020 7.3  6.0 - 8.5 g/dL Final   • Albumin 08/13/2020 4.30  3.50 - 5.20 g/dL Final   • ALT (SGPT) 08/13/2020 14  1 - 41 U/L Final   • AST (SGOT) 08/13/2020 18  1 - 40 U/L Final   • Alkaline Phosphatase 08/13/2020 73  39 - 117 U/L Final   • Total Bilirubin 08/13/2020 0.3  0.0 - 1.2 mg/dL Final   • eGFR   Amer 08/13/2020 85  >60 mL/min/1.73 Final   • Globulin 08/13/2020 3.0  gm/dL Final   • A/G Ratio 08/13/2020 1.4  g/dL Final   • BUN/Creatinine Ratio 08/13/2020 9.6  7.0 - 25.0 Final   • Anion Gap 08/13/2020 8.6  5.0 - 15.0 mmol/L Final   • Hepatitis C Ab 08/13/2020 Non-Reactive  Non-Reactive Final   • Report Summary 08/13/2020 FINAL   Final    ====================================================================  TOXASSURE SELECT 13 SABA-DIS  ====================================================================  Test                             Result       Flag       Units    NO DRUGS DETECTED.  ====================================================================  Test                      Result    Flag   Units      Ref Range    Creatinine               139              mg/dL      >=20  ====================================================================  Declared Medications:   Medication list was not provided.  ====================================================================  For clinical consultation, please call [phone].  ====================================================================   • Ethanol Screen Urine (Ref) 08/13/2020 Negative   Final   • Ethanol, Urine 08/13/2020 Not Detected  g/dL Final   • Amphetamine, Urine Qual 08/13/2020 Negative   Final   • Methamphetamine, Urine 08/13/2020 Not Detected  ng/mg creat Final   • Amphetamine, Urine 08/13/2020 Not Detected  ng/mg creat Final   • MDMA URINE 08/13/2020 Not Detected  ng/mg creat Final   • MDA 08/13/2020 Not Detected  ng/mg creat Final   • Benzodiazepine Screen, Urine 08/13/2020 Negative   Final   • DIAZEPAM URINE QUANT (REF) 08/13/2020 Not Detected  ng/mg creat Final   • Desmethyldiazepam 08/13/2020 Not Detected  ng/mg creat Final   • Oxazepam, urine 08/13/2020 Not Detected  ng/mg creat Final   • Temazepam, urine 08/13/2020 Not Detected  ng/mg creat Final    Expected metabolism of benzodiazepine class drugs:   Parent Drug       Detected Metabolites   -----------       --------------------   Diazepam:         Desmethyldiazepam, Temazepam, Oxazepam   Chlordiazepoxide: Desmethyldiazepam, Oxazepam   Clorazepate:      Desmethyldiazepam, Oxazepam   Halazepam:        Desmethyldiazepam, Oxazepam   Temazepam:        Oxazepam   Oxazepam:         None   • ALPRAZOLAM 08/13/2020 Not Detected  ng/mg creat Final   • ALPHA-HYDROXYALPRAZOLAM UR, QT (RE* 08/13/2020 Not Detected  ng/mg creat Final   • DESALKLFLURAZEPAM UR QUANT (REF) 08/13/2020 Not Detected  ng/mg creat Final   • LORAZEPAM URINE QUANT (REF) 08/13/2020 Not Detected  ng/mg creat Final   • Alpha-hydroxytriazolam, Urine 08/13/2020 Not Detected  ng/mg creat Final   • Clonazepam Urine 08/13/2020 Not Detected  ng/mg creat Final   •  7-Aminoclonazepam Urine 08/13/2020 Not Detected  ng/mg creat Final   • MIDAZOLAM UR, QUANT (REF) 08/13/2020 Not Detected  ng/mg creat Final   • Alpha-hydroxymidazolam, Urine 08/13/2020 Not Detected  ng/mg creat Final   • Flunitrazepam 08/13/2020 Not Detected  ng/mg creat Final   • DESMETHYLFLUNITRAZEPAM 08/13/2020 Not Detected  ng/mg creat Final   • Cocaine & Metabolite 08/13/2020 Negative   Final   • Cocaine Screen, Urine 08/13/2020 Not Detected  ng/mg creat Final   • Benzoylecgonine, Urine 08/13/2020 Not Detected  ng/mg creat Final   • Cocaethylene Ur 08/13/2020 Not Detected  ng/mg creat Final   • THC, Urine 08/13/2020 Negative   Final   • Carboxy THC, Urine 08/13/2020 Not Detected  ng/mg creat Final   • 6-ACETYLMORPHINE 08/13/2020 Negative   Final   • 6-acetylmorphine 08/13/2020 Not Detected  ng/mg creat Final   • Opiate Class 08/13/2020 Negative   Final   • Codeine, Urine 08/13/2020 Not Detected  ng/mg creat Final   • Morphine, Urine 08/13/2020 Not Detected  ng/mg creat Final   • normorphine 08/13/2020 Not Detected  ng/mg creat Final   • Norcodeine Ur 08/13/2020 Not Detected  ng/mg creat Final   • Hydrocodone UR 08/13/2020 Not Detected  ng/mg creat Final   • Hydromorphone Urine 08/13/2020 Not Detected  ng/mg creat Final   • Dihydrocodeine, Urine 08/13/2020 Not Detected  ng/mg creat Final   • Opiates, Norhydrocodone, Urine 08/13/2020 Not Detected  ng/mg creat Final    Expected metabolism of opiate class drugs:  Parent Drug       Detected Metabolites   -----------       --------------------   Codeine:          Major:  Morphine, Norcodeine                     Minor:  Hydrocodone, Hydromorphone,                             Dihydrocodeine, Norhydrocodone,                             Normorphine   Morphine:         Major:  Normorphine                     Minor:  Hydromorphone   Hydrocodone:      Hydromorphone, Dihydrocodeine,                     Norhydrocodone   Hydromorphone:    None   Dihydrocodeine:   None    Heroin:           6-Acetylmorphine (if included),                     Morphine, Normorphine                     Codeine, in small amounts in comparison                      to morphine, is often detected when                      heroin is the source drug.   • Oxycodone Class Ur 08/13/2020 Negative   Final   • Oxycodone, Confirmation, Urine 08/13/2020 Not Detected  ng/mg creat Final   • Oxymorphone UR 08/13/2020 Not Detected  ng/mg creat Final   • Opiates, Noroxycodone, Urine 08/13/2020 Not Detected  ng/mg creat Final   • Noroxymorphone 08/13/2020 Not Detected  ng/mg creat Final    Expected metabolism of oxycodone class drugs:   Parent Drug       Detected Metabolites   -----------       --------------------   Oxycodone:        Oxymorphone, Noroxycodone, Noroxymorphone   Oxymorphone:      Noroxymorphone   • Methadone 08/13/2020 Negative   Final   • Methadone, Quantitative 08/13/2020 Not Detected  ng/mg creat Final   • EDDP 08/13/2020 Not Detected  ng/mg creat Final   • Fentanyl and Analogues, Ur 08/13/2020 Negative   Final   • Fentanyl, Urine 08/13/2020 Not Detected  ng/mg creat Final   • Norfentanyl Urine 08/13/2020 Not Detected  ng/mg creat Final   • Sufentanil, Ur 08/13/2020 Not Detected  ng/mg creat Final   • Alfentanil, Ur 08/13/2020 Not Detected  ng/mg creat Final   • BUPRENORPHINE 08/13/2020 Negative   Final   • Buprenorphine, Urine 08/13/2020 Not Detected  ng/mg creat Final   • Norbuprenorphine 08/13/2020 Not Detected  ng/mg creat Final   • Tapentadol 08/13/2020 Negative   Final   • Tapentadol Ur 08/13/2020 Not Detected  ng/mg creat Final   • Opoidss other, UR 08/13/2020 Negative   Final   • Tramadol, Urine 08/13/2020 Not Detected  ng/mg creat Final   • O-desmethyltramadol, Ur 08/13/2020 Not Detected  ng/mg creat Final   • N-Desmethyltramadol, U 08/13/2020 Not Detected  ng/mg creat Final   • BARBITURATES, URINE 08/13/2020 Negative   Final   • Amobarbital, Urine 08/13/2020 Not Detected   Final   • Barbital  08/13/2020 Not Detected   Final   • Butabarbital, Ur 08/13/2020 Not Detected   Final   • Butalbital Screen, Urine 08/13/2020 Not Detected   Final   • Mephobarbital Urine 08/13/2020 Not Detected   Final   • Pentobarbital UR 08/13/2020 Not Detected   Final   • Phenobarbital 08/13/2020 Not Detected   Final   • Secobarbital, urine 08/13/2020 Not Detected   Final   • Thiopental, Ur 08/13/2020 Not Detected   Final   • Creatinine, Urine 08/13/2020 139  mg/dL Final    REFERENCE RANGE: Ref Range>=20   • Level of Detection: 08/13/2020 Comment   Final    TESTING THRESHOLDS ARE AS FOLLOWS:  AMPHETAMINES: 50 ng/mL  BENZODIAZEPINES: 20 ng/mL  COCAINE / METABOLITE: 50 ng/mL  ALCOHOL, ETHYL: 0.020 gm/dL  CANNABINOIDS: total-20 ng/mL, carboxy-THC-2 ng/mL  6-ACETYLMORPHINE: 10 ng/mL  OPIATE CLASS: 50 ng/mL/mL  OXYCODONE CLASS: 50 ng/mL  METHADONE: 50 ng/mL/mL  BUPRENORPHINE: buprenorphine-1.0 ng/mL,                 norbuprenorphine-5 ng/mL  FENTANYL / ANALOGUES: fentanyl-1.0 ng/mL, others-5.0 ng/mL  TAPENTADOL: 50 ng/mL  TRAMADOL: 50 ng/mL  BARBITURATES: 200 ng/mL  This test was developed and its performance characteristics  determined by [facility].  It has not been cleared or approved  by the Food and Drug Administration.   • WBC 08/13/2020 5.80  3.40 - 10.80 10*3/mm3 Final   • RBC 08/13/2020 4.45  4.14 - 5.80 10*6/mm3 Final   • Hemoglobin 08/13/2020 15.1  13.0 - 17.7 g/dL Final   • Hematocrit 08/13/2020 42.1  37.5 - 51.0 % Final   • MCV 08/13/2020 94.6  79.0 - 97.0 fL Final   • MCH 08/13/2020 33.9* 26.6 - 33.0 pg Final   • MCHC 08/13/2020 35.9* 31.5 - 35.7 g/dL Final   • RDW 08/13/2020 13.4  12.3 - 15.4 % Final   • RDW-SD 08/13/2020 46.4  37.0 - 54.0 fl Final   • MPV 08/13/2020 10.9  6.0 - 12.0 fL Final   • Platelets 08/13/2020 231  140 - 450 10*3/mm3 Final   • Neutrophil % 08/13/2020 59.3  42.7 - 76.0 % Final   • Lymphocyte % 08/13/2020 27.1  19.6 - 45.3 % Final   • Monocyte % 08/13/2020 7.6  5.0 - 12.0 % Final   • Eosinophil %  08/13/2020 5.0  0.3 - 6.2 % Final   • Basophil % 08/13/2020 0.5  0.0 - 1.5 % Final   • Immature Grans % 08/13/2020 0.5  0.0 - 0.5 % Final   • Neutrophils, Absolute 08/13/2020 3.44  1.70 - 7.00 10*3/mm3 Final   • Lymphocytes, Absolute 08/13/2020 1.57  0.70 - 3.10 10*3/mm3 Final   • Monocytes, Absolute 08/13/2020 0.44  0.10 - 0.90 10*3/mm3 Final   • Eosinophils, Absolute 08/13/2020 0.29  0.00 - 0.40 10*3/mm3 Final   • Basophils, Absolute 08/13/2020 0.03  0.00 - 0.20 10*3/mm3 Final   • Immature Grans, Absolute 08/13/2020 0.03  0.00 - 0.05 10*3/mm3 Final   • nRBC 08/13/2020 0.2  0.0 - 0.2 /100 WBC Final      No image results found.    @[email or web address]@    There is no immunization history on file for this patient.    The following portions of the patient's history were reviewed and updated as appropriate: allergies, current medications, past family history, past medical history, past social history, past surgical history and problem list.    Physical Exam  Physical Exam  Constitutional:       General: He is not in acute distress.  HENT:      Head: Normocephalic and atraumatic.      Right Ear: Tympanic membrane and ear canal normal.      Left Ear: Tympanic membrane and ear canal normal.      Mouth/Throat:      Mouth: Mucous membranes are moist.      Pharynx: Oropharynx is clear. No posterior oropharyngeal erythema.   Eyes:      Extraocular Movements: Extraocular movements intact.      Pupils: Pupils are equal, round, and reactive to light.   Cardiovascular:      Rate and Rhythm: Normal rate and regular rhythm.      Heart sounds: Normal heart sounds. No murmur.   Pulmonary:      Effort: Pulmonary effort is normal.      Breath sounds: Normal breath sounds.   Abdominal:      General: Bowel sounds are normal.      Palpations: Abdomen is soft.      Tenderness: There is no abdominal tenderness.   Musculoskeletal:         General: No swelling.      Right lower leg: No edema.      Left lower leg: No edema.   Skin:     Coloration:  Skin is not jaundiced.      Findings: No rash.   Neurological:      Mental Status: He is alert and oriented to person, place, and time. Mental status is at baseline.   Psychiatric:         Attention and Perception: Attention normal.         Mood and Affect: Mood normal.         Speech: Speech normal.         Behavior: Behavior normal.       Assessment/Plan    Diagnosis Plan   1. Essential hypertension  amLODIPine (NORVASC) 10 MG tablet    hydroCHLOROthiazide (HYDRODIURIL) 25 MG tablet    propranolol (INDERAL) 10 MG tablet   2. Low serum potassium  potassium chloride (K-DUR,KLOR-CON) 20 MEQ CR tablet   3. Chronic midline low back pain, unspecified whether sciatica present  methocarbamol (Robaxin) 500 MG tablet   4. Chronic pain syndrome  Ambulatory Referral to Pain Management   5. Bipolar affective disorder, remission status unspecified (CMS/[facility])        Orders Placed This Encounter   Procedures   • Ambulatory Referral to Pain Management     Referral Priority:   Routine     Referral Type:   Pain Management     Referral Reason:   Specialty Services Required     Requested Specialty:   Pain Medicine     Number of Visits Requested:   1     Medication refills as above, counseled on importance of good blood pressure control, states has whitecoat syndrome, will bring in blood pressure log to follow-up in 1 month.    Chronic pain, secondary to hip fracture; currently seeing pain management in [place], reports good compliance, will refer to pain management in [place] as per patient preference.  Methocarbamol for muscle-isms.    History of bipolar disorder; unclear, patient denies symptoms currently, has not been on medication for months.  Patient admits to prior drug use, perhaps previous diagnosis was secondary to intoxication/withdrawal.  Currently without signs or symptoms, does endorse possible auditory hallucinations which is concerning however patient reports has always had that inner monologue.  Denied  suicidal ideations, homicidal ideations, history of suicide attempts, or berta.  Strict ED precautions given, see psychiatry at [facility], will follow up with them.  Patient appropriate on exam today.      This document has been electronically signed by Jet Francois MD on January 18, 2021 21:05 CST

## 2021-01-27 ENCOUNTER — TELEPHONE (OUTPATIENT)
Dept: FAMILY MEDICINE CLINIC | Facility: CLINIC | Age: 41
End: 2021-01-27

## 2021-01-27 NOTE — TELEPHONE ENCOUNTER
Called pt to discuss pain management referral options, pt was D/C from [facility]. No answer and Vm is not setup at this time.

## 2021-02-05 NOTE — TELEPHONE ENCOUNTER
I discussed the issue with the Pharmacy and I will not be able to refill it earlier.   DC instructions

## 2021-02-18 DIAGNOSIS — I10 ESSENTIAL HYPERTENSION: ICD-10-CM

## 2021-02-18 RX ORDER — HYDROCHLOROTHIAZIDE 25 MG/1
TABLET ORAL
Qty: 30 TABLET | Refills: 0 | OUTPATIENT
Start: 2021-02-18

## 2021-06-08 ENCOUNTER — OFFICE VISIT (OUTPATIENT)
Dept: FAMILY MEDICINE CLINIC | Facility: CLINIC | Age: 41
End: 2021-06-08

## 2021-06-08 VITALS
DIASTOLIC BLOOD PRESSURE: 80 MMHG | OXYGEN SATURATION: 99 % | SYSTOLIC BLOOD PRESSURE: 182 MMHG | TEMPERATURE: 98.2 F | HEART RATE: 84 BPM | BODY MASS INDEX: 23.01 KG/M2 | WEIGHT: 160.7 LBS | HEIGHT: 70 IN

## 2021-06-08 DIAGNOSIS — Z87.09 HISTORY OF ACUTE BRONCHITIS WITH BRONCHOSPASM: ICD-10-CM

## 2021-06-08 DIAGNOSIS — G89.29 CHRONIC MIDLINE LOW BACK PAIN, UNSPECIFIED WHETHER SCIATICA PRESENT: ICD-10-CM

## 2021-06-08 DIAGNOSIS — E87.6 LOW SERUM POTASSIUM: ICD-10-CM

## 2021-06-08 DIAGNOSIS — M54.50 CHRONIC MIDLINE LOW BACK PAIN, UNSPECIFIED WHETHER SCIATICA PRESENT: ICD-10-CM

## 2021-06-08 DIAGNOSIS — G89.29 OTHER CHRONIC PAIN: ICD-10-CM

## 2021-06-08 DIAGNOSIS — I10 ESSENTIAL HYPERTENSION: Primary | ICD-10-CM

## 2021-06-08 PROCEDURE — 99213 OFFICE O/P EST LOW 20 MIN: CPT | Performed by: CHIROPRACTOR

## 2021-06-08 RX ORDER — HALOPERIDOL 5 MG/1
5 TABLET ORAL DAILY
Qty: 30 TABLET | Refills: 3 | Status: CANCELLED | OUTPATIENT
Start: 2021-06-08

## 2021-06-08 RX ORDER — SILDENAFIL 100 MG/1
100 TABLET, FILM COATED ORAL SEE ADMIN INSTRUCTIONS
Qty: 10 TABLET | Refills: 2 | Status: CANCELLED | OUTPATIENT
Start: 2021-06-08

## 2021-06-08 RX ORDER — METHOCARBAMOL 500 MG/1
500 TABLET, FILM COATED ORAL 4 TIMES DAILY
Qty: 56 TABLET | Refills: 1 | Status: SHIPPED | OUTPATIENT
Start: 2021-06-08

## 2021-06-08 RX ORDER — BUSPIRONE HYDROCHLORIDE 15 MG/1
15 TABLET ORAL DAILY
Status: CANCELLED | OUTPATIENT
Start: 2021-06-08

## 2021-06-08 RX ORDER — AMLODIPINE BESYLATE 10 MG/1
10 TABLET ORAL DAILY
Qty: 30 TABLET | Refills: 2 | Status: SHIPPED | OUTPATIENT
Start: 2021-06-08 | End: 2021-11-09 | Stop reason: SDUPTHER

## 2021-06-08 RX ORDER — ALBUTEROL SULFATE 90 UG/1
2 AEROSOL, METERED RESPIRATORY (INHALATION) EVERY 4 HOURS PRN
Qty: 18 G | Refills: 2 | Status: SHIPPED | OUTPATIENT
Start: 2021-06-08

## 2021-06-08 RX ORDER — QUETIAPINE FUMARATE 200 MG/1
200 TABLET, FILM COATED ORAL NIGHTLY
Qty: 30 TABLET | Refills: 5 | Status: CANCELLED | OUTPATIENT
Start: 2021-06-08

## 2021-06-08 RX ORDER — HYDROCHLOROTHIAZIDE 25 MG/1
25 TABLET ORAL DAILY
Qty: 30 TABLET | Refills: 2 | Status: SHIPPED | OUTPATIENT
Start: 2021-06-08 | End: 2021-12-07

## 2021-06-08 RX ORDER — BENZTROPINE MESYLATE 1 MG/1
1 TABLET ORAL DAILY
Qty: 30 TABLET | Refills: 2 | Status: CANCELLED | OUTPATIENT
Start: 2021-06-08

## 2021-06-08 RX ORDER — ARIPIPRAZOLE 5 MG/1
5 TABLET ORAL DAILY
Qty: 30 TABLET | Refills: 3 | Status: CANCELLED | OUTPATIENT
Start: 2021-06-08

## 2021-06-08 RX ORDER — CYPROHEPTADINE HYDROCHLORIDE 4 MG/1
4 TABLET ORAL DAILY
Status: CANCELLED | OUTPATIENT
Start: 2021-06-08

## 2021-06-08 RX ORDER — PROPRANOLOL HYDROCHLORIDE 10 MG/1
10 TABLET ORAL 2 TIMES DAILY
Qty: 60 TABLET | Refills: 4 | Status: SHIPPED | OUTPATIENT
Start: 2021-06-08 | End: 2021-12-09

## 2021-06-08 RX ORDER — POTASSIUM CHLORIDE 20 MEQ/1
20 TABLET, EXTENDED RELEASE ORAL DAILY
Qty: 30 TABLET | Refills: 4 | Status: SHIPPED | OUTPATIENT
Start: 2021-06-08

## 2021-06-08 RX ORDER — GABAPENTIN 300 MG/1
300 CAPSULE ORAL 3 TIMES DAILY
Status: CANCELLED | OUTPATIENT
Start: 2021-06-08

## 2021-06-08 RX ORDER — OXYCODONE AND ACETAMINOPHEN 7.5; 325 MG/1; MG/1
1 TABLET ORAL EVERY 6 HOURS PRN
Qty: 120 TABLET | Refills: 0 | Status: CANCELLED | OUTPATIENT
Start: 2021-06-08

## 2021-06-08 RX ORDER — VENLAFAXINE 75 MG/1
TABLET ORAL
Status: CANCELLED | OUTPATIENT
Start: 2021-06-08
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
## 2021-06-08 NOTE — PROGRESS NOTES
I have seen the patient.  I have reviewed the notes, assessments, and/or procedures performed by Oleg Reaves MD, I concur with her/his documentation and assessment and plan for Bogdan Thompson.               This document has been electronically signed by Uzair Miller MD on June 8, 2021 15:24 CDT

## 2021-11-09 ENCOUNTER — OFFICE VISIT (OUTPATIENT)
Dept: FAMILY MEDICINE CLINIC | Facility: CLINIC | Age: 41
End: 2021-11-09

## 2021-11-09 VITALS
SYSTOLIC BLOOD PRESSURE: 160 MMHG | HEART RATE: 102 BPM | RESPIRATION RATE: 20 BRPM | OXYGEN SATURATION: 97 % | DIASTOLIC BLOOD PRESSURE: 100 MMHG | HEIGHT: 70 IN | WEIGHT: 160 LBS | TEMPERATURE: 97.3 F | BODY MASS INDEX: 22.9 KG/M2

## 2021-11-09 DIAGNOSIS — I10 ESSENTIAL HYPERTENSION: ICD-10-CM

## 2021-11-09 DIAGNOSIS — J40 BRONCHITIS: Primary | ICD-10-CM

## 2021-11-09 PROCEDURE — 99214 OFFICE O/P EST MOD 30 MIN: CPT | Performed by: NURSE PRACTITIONER

## 2021-11-09 RX ORDER — DEXTROMETHORPHAN HYDROBROMIDE AND PROMETHAZINE HYDROCHLORIDE 15; 6.25 MG/5ML; MG/5ML
5 SYRUP ORAL 4 TIMES DAILY PRN
Qty: 180 ML | Refills: 0 | Status: SHIPPED | OUTPATIENT
Start: 2021-11-09 | End: 2021-12-10

## 2021-11-09 RX ORDER — AZITHROMYCIN 250 MG/1
TABLET, FILM COATED ORAL
Qty: 6 TABLET | Refills: 0 | Status: SHIPPED | OUTPATIENT
Start: 2021-11-09 | End: 2021-11-29

## 2021-11-09 RX ORDER — AMLODIPINE BESYLATE 10 MG/1
10 TABLET ORAL DAILY
Qty: 30 TABLET | Refills: 0 | Status: SHIPPED | OUTPATIENT
Start: 2021-11-09 | End: 2021-12-07

## 2021-11-09 NOTE — PROGRESS NOTES
"Chief Complaint  Cough, URI, and Nasal Congestion    Subjective    History of Present Illness {CC  Problem List  Visit  Diagnosis   Encounters  Notes  Medications  Labs  Result Review Imaging  Media :23}     Bogdan Thompson presents to [facility] [place] for   C/o dry cough, congestion, runny nose x \"couple of weeks\" - denies fever, chills, SOA or loss of taste/smell. Has taken OTC Nyquil that has not helped. Denies known exposure to COVID - has been vaccinated. Is requesting \"something with codeine\" for cough. Also requesting refill of percocet and viagra. BP elevated at todays visit - pt states he has been out of his medication for a few days.        Objective     Physical Exam  Vitals and nursing note reviewed.   Constitutional:       General: He is not in acute distress.     Appearance: Normal appearance. He is normal weight. He is not ill-appearing.   HENT:      Head: Normocephalic and atraumatic.      Right Ear: Tympanic membrane, ear canal and external ear normal.      Left Ear: Tympanic membrane, ear canal and external ear normal.      Nose: Congestion present. No rhinorrhea.      Mouth/Throat:      Mouth: Mucous membranes are moist.      Dentition: Abnormal dentition. Dental caries present.      Pharynx: Oropharynx is clear. No posterior oropharyngeal erythema.   Eyes:      Conjunctiva/sclera: Conjunctivae normal.      Pupils: Pupils are equal, round, and reactive to light.   Cardiovascular:      Rate and Rhythm: Normal rate and regular rhythm.      Heart sounds: Normal heart sounds.   Pulmonary:      Effort: Pulmonary effort is normal.      Breath sounds: Normal breath sounds. No wheezing or rhonchi.   Musculoskeletal:         General: Normal range of motion.      Cervical back: Normal range of motion and neck supple.   Lymphadenopathy:      Cervical: No cervical adenopathy.   Skin:     General: Skin is warm and dry.   Neurological:      General: No " "focal deficit present.      Mental Status: He is alert and oriented to person, place, and time.   Psychiatric:         Mood and Affect: Mood normal.         Behavior: Behavior normal.        Result Review  Data Reviewed:{ Labs  Result Review  Imaging  Med Tab  Media :23}   The following data was reviewed by (Optional):[id number]}    No Images in the past 120 days found..       Vital Signs:   /100 (BP Location: Right arm, Patient Position: Sitting, Cuff Size: Adult)   Pulse 102   Temp 97.3 °F (36.3 °C) (Temporal)   Resp 20   Ht 177.8 cm (70\")   Wt 72.6 kg (160 lb)   SpO2 97%   BMI 22.96 kg/m²          Assessment and Plan {CC Problem List  Visit Diagnosis  ROS  Review (Popup)  Health Maintenance  Quality  BestPractice  Medications  SmartSets  SnapShot Encounters  Media :23}   Problem List Items Addressed This Visit     None      Visit Diagnoses     Bronchitis    -  Primary    Relevant Medications    azithromycin (Zithromax Z-Kam) 250 MG tablet    promethazine-dextromethorphan (PROMETHAZINE-DM) 6.25-15 MG/5ML syrup    Essential hypertension        Relevant Medications    amLODIPine (NORVASC) 10 MG tablet         Diagnosis Plan   1. Bronchitis  azithromycin (Zithromax Z-Kam) 250 MG tablet    promethazine-dextromethorphan (PROMETHAZINE-DM) 6.25-15 MG/5ML syrup   2. Essential hypertension  amLODIPine (NORVASC) 10 MG tablet     - D/t length of symptoms will treat empirically to cover bacterial etiology. Zpack prescribed.  - RX for PromDM prescribed for cough  - Discussed that pt would need to establish care with PCP in order to discuss refills of percocet and viagra and most likely will need referred to pain mgmt.   - Pt is poor historian and unsure of what BP meds he needs. Will refill amlodipine today and have pt make appt to f/u with PCP.  - RTC PRN - Est care visit scheduled with Dr. Francosi on 11/29/21 - pt given appt information.    Follow Up {Instructions Charge Capture  Follow-up " Communications :23}   Return if symptoms worsen or fail to improve.  Patient was given instructions and counseling regarding his condition or for health maintenance advice. Please see specific information pulled into the AVS if appropriate            .  This document has been electronically signed by BREE Schaeffer on November 9, 2021 16:50 CST

## 2021-11-09 NOTE — PATIENT INSTRUCTIONS
Acute Bronchitis, Adult    Acute bronchitis is when air tubes in the lungs (bronchi) suddenly get swollen. The condition can make it hard for you to breathe. In adults, acute bronchitis usually goes away within 2 weeks. A cough caused by bronchitis may last up to 3 weeks. Smoking, allergies, and asthma can make the condition worse.  What are the causes?  This condition is caused by:  · Cold and flu viruses. The most common cause of this condition is the virus that causes the common cold.  · Bacteria.  · Substances that irritate the lungs, including:  ? Smoke from cigarettes and other types of tobacco.  ? Dust and pollen.  ? Fumes from chemicals, gases, or burned fuel.  ? Other materials that pollute indoor or outdoor air.  · Close contact with someone who has acute bronchitis.  What increases the risk?  The following factors may make you more likely to develop this condition:  · A weak body's defense system. This is also called the immune system.  · Any condition that affects your lungs and breathing, such as asthma.  What are the signs or symptoms?  Symptoms of this condition include:  · A cough.  · Coughing up clear, yellow, or green mucus.  · Wheezing.  · Chest congestion.  · Shortness of breath.  · A fever.  · Body aches.  · Chills.  · A sore throat.  How is this treated?  Acute bronchitis may go away over time without treatment. Your doctor may recommend:  · Drinking more fluids.  · Taking a medicine for a fever or cough.  · Using a device that gets medicine into your lungs (inhaler).  · Using a vaporizer or a humidifier. These are machines that add water or moisture in the air to help with coughing and poor breathing.  Follow these instructions at home:    Activity  · Get a lot of rest.  · Avoid places where there are fumes from chemicals.  · Return to your normal activities as told by your doctor. Ask your doctor what activities are safe for you.  Lifestyle  · Drink enough fluids to keep your pee (urine) pale  yellow.  · Do not drink alcohol.  · Do not use any products that contain nicotine or tobacco, such as cigarettes, e-cigarettes, and chewing tobacco. If you need help quitting, ask your doctor. Be aware that:  ? Your bronchitis will get worse if you smoke or breathe in other people's smoke (secondhand smoke).  ? Your lungs will heal faster if you quit smoking.  General instructions  · Take over-the-counter and prescription medicines only as told by your doctor.  · Use an inhaler, cool mist vaporizer, or humidifier as told by your doctor.  · Rinse your mouth often with salt water. To make salt water, dissolve ½-1 tsp (3-6 g) of salt in 1 cup (237 mL) of warm water.  · Keep all follow-up visits as told by your doctor. This is important.  How is this prevented?  To lower your risk of getting this condition again:  · Wash your hands often with soap and water. If soap and water are not available, use hand .  · Avoid contact with people who have cold symptoms.  · Try not to touch your mouth, nose, or eyes with your hands.  · Make sure to get the flu shot every year.  Contact a doctor if:  · Your symptoms do not get better in 2 weeks.  · You vomit more than once or twice.  · You have symptoms of loss of fluid from your body (dehydration). These include:  ? Dark urine.  ? Dry skin or eyes.  ? Increased thirst.  ? Headaches.  ? Confusion.  ? Muscle cramps.  Get help right away if:  · You cough up blood.  · You have chest pain.  · You have very bad shortness of breath.  · You become dehydrated.  · You faint or keep feeling like you are going to faint.  · You keep vomiting.  · You have a very bad headache.  · Your fever or chills get worse.  These symptoms may be an emergency. Do not wait to see if the symptoms will go away. Get medical help right away. Call your local emergency services (911 in the [place] Do not drive yourself to the hospital.  Summary  · Acute bronchitis is when air tubes in the lungs (bronchi)  suddenly get swollen. In adults, acute bronchitis usually goes away within 2 weeks.  · Take over-the-counter and prescription medicines only as told by your doctor.  · Drink enough fluid to keep your pee (urine) pale yellow.  · Contact a doctor if your symptoms do not improve after 2 weeks of treatment.  · Get help right away if you cough up blood, faint, or have chest pain or shortness of breath.  This information is not intended to replace advice given to you by your health care provider. Make sure you discuss any questions you have with your health care provider.  Document Revised: 07/10/2020 Document Reviewed: 07/10/2020  [employer] Patient Education © 2021 [employer]

## 2021-11-29 ENCOUNTER — TELEPHONE (OUTPATIENT)
Dept: FAMILY MEDICINE CLINIC | Facility: CLINIC | Age: 41
End: 2021-11-29

## 2021-11-29 ENCOUNTER — OFFICE VISIT (OUTPATIENT)
Dept: FAMILY MEDICINE CLINIC | Facility: CLINIC | Age: 41
End: 2021-11-29

## 2021-11-29 VITALS
BODY MASS INDEX: 21.76 KG/M2 | TEMPERATURE: 98 F | SYSTOLIC BLOOD PRESSURE: 188 MMHG | OXYGEN SATURATION: 99 % | HEART RATE: 122 BPM | WEIGHT: 152 LBS | HEIGHT: 70 IN | DIASTOLIC BLOOD PRESSURE: 120 MMHG

## 2021-11-29 DIAGNOSIS — I16.1 HYPERTENSIVE EMERGENCY: Primary | ICD-10-CM

## 2021-11-29 PROCEDURE — 99214 OFFICE O/P EST MOD 30 MIN: CPT | Performed by: STUDENT IN AN ORGANIZED HEALTH CARE EDUCATION/TRAINING PROGRAM

## 2021-11-29 PROCEDURE — 93005 ELECTROCARDIOGRAM TRACING: CPT | Performed by: STUDENT IN AN ORGANIZED HEALTH CARE EDUCATION/TRAINING PROGRAM

## 2021-11-29 PROCEDURE — 93010 ELECTROCARDIOGRAM REPORT: CPT | Performed by: INTERNAL MEDICINE

## 2021-11-29 RX ORDER — CARIPRAZINE 1.5 MG/1
1 CAPSULE, GELATIN COATED ORAL DAILY
COMMUNITY
Start: 2021-11-16

## 2021-11-29 RX ORDER — LOSARTAN POTASSIUM 50 MG/1
50 TABLET ORAL DAILY
Qty: 90 TABLET | Refills: 0 | Status: SHIPPED | OUTPATIENT
Start: 2021-11-29 | End: 2021-12-07 | Stop reason: SDUPTHER

## 2021-11-29 NOTE — TELEPHONE ENCOUNTER
Left message to let patient know his hospital follow up appointment is set up for 12/07/21 Tuesday.

## 2021-11-29 NOTE — PROGRESS NOTES
"Subjective:  Bogdan Thompson is a 41 y.o. male who presents for      Hypertension; currently hydrochlorothiazide 25 mg daily, amlodipine 10 mg daily, tolerating medication well, no adverse effects.  Does not check blood pressure at home, denied any chest pain, shortness of breath, headaches, vision changes, numbness, tingling, weakness.  Blood pressure today was 188/120, states has been taking medication everyday, denied medication changes or illicit drug use.    Patient Active Problem List   Diagnosis   • Pain of right hip joint   • Chronic bilateral low back pain with right-sided sciatica   • Insomnia   • Hypertension   • Anxiety   • ED (erectile dysfunction) of non-organic origin   • Chronic low back pain   • Other chronic pain   • Low serum potassium     Vitals:    Vitals:    11/29/21 1046   BP: (!) 188/120   BP Location: Left arm   Patient Position: Sitting   Cuff Size: Adult   Pulse: (!) 122   Temp: 98 °F (36.7 °C)   SpO2: 99%   Weight: 68.9 kg (152 lb)   Height: 177.8 cm (70\")     Body mass index is 21.81 kg/m².      Current Outpatient Medications:   •  albuterol sulfate  (90 Base) MCG/ACT inhaler, Inhale 2 puffs Every 4 (Four) Hours As Needed for Wheezing., Disp: 18 g, Rfl: 2  •  amLODIPine (NORVASC) 10 MG tablet, Take 1 tablet by mouth Daily., Disp: 30 tablet, Rfl: 0  •  ARIPiprazole (ABILIFY) 5 MG tablet, Take 1 tablet by mouth Daily., Disp: 30 tablet, Rfl: 3  •  benztropine (COGENTIN) 1 MG tablet, Take 1 tablet by mouth Daily., Disp: 30 tablet, Rfl: 2  •  busPIRone (BUSPAR) 15 MG tablet, Take 1 tablet by mouth Daily., Disp: , Rfl:   •  cyproheptadine (PERIACTIN) 4 MG tablet, Take 1 tablet by mouth Daily., Disp: , Rfl:   •  gabapentin (NEURONTIN) 300 MG capsule, Take 1 capsule by mouth 3 (Three) Times a Day., Disp: , Rfl:   •  haloperidol (HALDOL) 5 MG tablet, Take 1 tablet by mouth Daily., Disp: 30 tablet, Rfl: 3  •  hydroCHLOROthiazide (HYDRODIURIL) 25 MG tablet, Take 1 tablet by mouth Daily., Disp: 30 " tablet, Rfl: 2  •  methocarbamol (Robaxin) 500 MG tablet, Take 1 tablet by mouth 4 (Four) Times a Day., Disp: 56 tablet, Rfl: 1  •  oxyCODONE-acetaminophen (Percocet) 7.5-325 MG per tablet, Take 1 tablet by mouth Every 6 (Six) Hours As Needed (pain)., Disp: 120 tablet, Rfl: 0  •  potassium chloride (K-DUR,KLOR-CON) 20 MEQ CR tablet, Take 1 tablet by mouth Daily., Disp: 30 tablet, Rfl: 4  •  promethazine-dextromethorphan (PROMETHAZINE-DM) 6.25-15 MG/5ML syrup, Take 5 mL by mouth 4 (Four) Times a Day As Needed for Cough., Disp: 180 mL, Rfl: 0  •  propranolol (INDERAL) 10 MG tablet, Take 1 tablet by mouth 2 (Two) Times a Day., Disp: 60 tablet, Rfl: 4  •  QUEtiapine (SEROquel) 200 MG tablet, Take 1 tablet by mouth Every Night., Disp: 30 tablet, Rfl: 5  •  sildenafil (VIAGRA) 100 MG tablet, Take 1 tablet by mouth See Admin Instructions., Disp: 10 tablet, Rfl: 2  •  venlafaxine (EFFEXOR) 75 MG tablet, , Disp: , Rfl:   •  Vraylar 1.5 MG capsule capsule, 1 capsule Daily., Disp: , Rfl:   •  losartan (Cozaar) 50 MG tablet, Take 1 tablet by mouth Daily., Disp: 90 tablet, Rfl: 0    Patient Active Problem List   Diagnosis   • Pain of right hip joint   • Chronic bilateral low back pain with right-sided sciatica   • Insomnia   • Hypertension   • Anxiety   • ED (erectile dysfunction) of non-organic origin   • Chronic low back pain   • Other chronic pain   • Low serum potassium     Past Surgical History:   Procedure Laterality Date   • HIP FRACTURE SURGERY Right 2017     Social History     Socioeconomic History   • Marital status: Single   Tobacco Use   • Smoking status: Current Every Day Smoker     Packs/day: 0.50     Years: 5.00     Pack years: 2.50     Types: Cigarettes     Last attempt to quit: 2021     Years since quittin.4   • Smokeless tobacco: Never Used   Vaping Use   • Vaping Use: Never used   Substance and Sexual Activity   • Alcohol use: No   • Drug use: No   • Sexual activity: Yes     Family History    Problem Relation Age of Onset   • Hypertension Brother      No visits with results within 6 Month(s) from this visit.   Latest known visit with results is:   Lab on 09/15/2020   Component Date Value Ref Range Status   • THC, Screen, Urine 09/15/2020 Negative  Negative Final   • Phencyclidine (PCP), Urine 09/15/2020 Negative  Negative Final   • Cocaine Screen, Urine 09/15/2020 Negative  Negative Final   • Methamphetamine, Ur 09/15/2020 Negative  Negative Final   • Opiate Screen 09/15/2020 Negative  Negative Final   • Amphetamine Screen, Urine 09/15/2020 Negative  Negative Final   • Benzodiazepine Screen, Urine 09/15/2020 Negative  Negative Final   • Tricyclic Antidepressants Screen 09/15/2020 Negative  Negative Final   • Methadone Screen, Urine 09/15/2020 Negative  Negative Final   • Barbiturates Screen, Urine 09/15/2020 Negative  Negative Final   • Oxycodone Screen, Urine 09/15/2020 Negative  Negative Final   • Propoxyphene Screen 09/15/2020 Negative  Negative Final   • Buprenorphine, Screen, Urine 09/15/2020 Negative  Negative Final      No image results found.      [unfilled]  Immunization History   Administered Date(s) Administered   • COVID-19 (MODERNA) 1st, 2nd, 3rd Dose Only 05/20/2021     The following portions of the patient's history were reviewed and updated as appropriate: allergies, current medications, past family history, past medical history, past social history, past surgical history and problem list.    PHQ-9 Total Score:           Physical Exam  Constitutional:       General: He is not in acute distress.  HENT:      Head: Normocephalic and atraumatic.   Cardiovascular:      Rate and Rhythm: Normal rate and regular rhythm.      Heart sounds: Normal heart sounds. No murmur heard.      Pulmonary:      Effort: Pulmonary effort is normal.      Breath sounds: Normal breath sounds.   Abdominal:      Palpations: Abdomen is soft.      Tenderness: There is no abdominal tenderness.   Musculoskeletal:          General: No swelling.      Right lower leg: No edema.      Left lower leg: No edema.   Skin:     Coloration: Skin is not jaundiced.      Findings: No rash.   Neurological:      Mental Status: He is alert and oriented to person, place, and time. Mental status is at baseline.   Psychiatric:         Mood and Affect: Mood normal.         Behavior: Behavior normal.         Assessment/Plan    Diagnosis Plan   1. Hypertensive emergency  ECG 12 Lead    losartan (Cozaar) 50 MG tablet    Ambulatory Referral to Cardiology      Orders Placed This Encounter   Procedures   • Ambulatory Referral to Cardiology     Referral Priority:   Routine     Referral Type:   Consultation     Referral Reason:   Specialty Services Required     Requested Specialty:   Cardiology     Number of Visits Requested:   1   • ECG 12 Lead     Order Specific Question:   Reason for Exam:     Answer:   hypertension     Order Specific Question:   Release to patient     Answer:   Immediate     Hypertension; due to severity, risk for acute cardiovascular event, patient informed to go to ER, will start on losartan in addition to other medications, patient voiced understanding, agreeable to plan.  ECG performed, significant for evidence of LVH, no ST changes, patient asymptomatic, reassuring.  Will refer to cardiology for further management.  Patient to follow-up in 1 week, sooner if needed.          This document has been electronically signed by Jet Francois MD on November 29, 2021 13:14 CST

## 2021-11-30 LAB
QT INTERVAL: 340 MS
QTC INTERVAL: 455 MS

## 2021-12-06 ENCOUNTER — TELEPHONE (OUTPATIENT)
Dept: FAMILY MEDICINE CLINIC | Facility: CLINIC | Age: 41
End: 2021-12-06

## 2021-12-07 ENCOUNTER — LAB (OUTPATIENT)
Dept: LAB | Facility: HOSPITAL | Age: 41
End: 2021-12-07

## 2021-12-07 ENCOUNTER — APPOINTMENT (OUTPATIENT)
Dept: LAB | Facility: HOSPITAL | Age: 41
End: 2021-12-07

## 2021-12-07 ENCOUNTER — OFFICE VISIT (OUTPATIENT)
Dept: FAMILY MEDICINE CLINIC | Facility: CLINIC | Age: 41
End: 2021-12-07

## 2021-12-07 VITALS
HEART RATE: 79 BPM | BODY MASS INDEX: 21.47 KG/M2 | SYSTOLIC BLOOD PRESSURE: 170 MMHG | WEIGHT: 150 LBS | HEIGHT: 70 IN | TEMPERATURE: 97.5 F | DIASTOLIC BLOOD PRESSURE: 110 MMHG | OXYGEN SATURATION: 100 %

## 2021-12-07 DIAGNOSIS — I16.1 HYPERTENSIVE EMERGENCY: ICD-10-CM

## 2021-12-07 DIAGNOSIS — I11.9 HYPERTENSIVE LEFT VENTRICULAR HYPERTROPHY, WITHOUT HEART FAILURE: Primary | ICD-10-CM

## 2021-12-07 DIAGNOSIS — I10 ESSENTIAL HYPERTENSION: ICD-10-CM

## 2021-12-07 DIAGNOSIS — M25.551 CHRONIC HIP PAIN AFTER TOTAL REPLACEMENT OF RIGHT HIP JOINT: ICD-10-CM

## 2021-12-07 DIAGNOSIS — J40 BRONCHITIS: ICD-10-CM

## 2021-12-07 DIAGNOSIS — I11.9 HYPERTENSIVE LEFT VENTRICULAR HYPERTROPHY, WITHOUT HEART FAILURE: ICD-10-CM

## 2021-12-07 DIAGNOSIS — E87.6 LOW SERUM POTASSIUM: ICD-10-CM

## 2021-12-07 DIAGNOSIS — G89.29 CHRONIC HIP PAIN AFTER TOTAL REPLACEMENT OF RIGHT HIP JOINT: ICD-10-CM

## 2021-12-07 DIAGNOSIS — Z96.641 CHRONIC HIP PAIN AFTER TOTAL REPLACEMENT OF RIGHT HIP JOINT: ICD-10-CM

## 2021-12-07 PROCEDURE — 99214 OFFICE O/P EST MOD 30 MIN: CPT | Performed by: STUDENT IN AN ORGANIZED HEALTH CARE EDUCATION/TRAINING PROGRAM

## 2021-12-07 PROCEDURE — 80048 BASIC METABOLIC PNL TOTAL CA: CPT

## 2021-12-07 RX ORDER — CHLORTHALIDONE 25 MG/1
25 TABLET ORAL EVERY MORNING
Qty: 90 TABLET | Refills: 1 | Status: SHIPPED | OUTPATIENT
Start: 2021-12-07

## 2021-12-07 RX ORDER — LOSARTAN POTASSIUM 50 MG/1
50 TABLET ORAL NIGHTLY
Qty: 90 TABLET | Refills: 1 | Status: SHIPPED | OUTPATIENT
Start: 2021-12-07 | End: 2021-12-09

## 2021-12-07 RX ORDER — LOSARTAN POTASSIUM 50 MG/1
TABLET ORAL
Qty: 90 TABLET | Refills: 0 | OUTPATIENT
Start: 2021-12-07

## 2021-12-07 RX ORDER — DEXTROMETHORPHAN HYDROBROMIDE AND PROMETHAZINE HYDROCHLORIDE 15; 6.25 MG/5ML; MG/5ML
SYRUP ORAL
Qty: 180 ML | Refills: 0 | OUTPATIENT
Start: 2021-12-07

## 2021-12-07 RX ORDER — POTASSIUM CHLORIDE 20 MEQ/1
TABLET, EXTENDED RELEASE ORAL
Qty: 30 TABLET | Refills: 0 | OUTPATIENT
Start: 2021-12-07

## 2021-12-07 RX ORDER — AMLODIPINE BESYLATE 10 MG/1
10 TABLET ORAL DAILY
Qty: 90 TABLET | Refills: 3 | Status: SHIPPED | OUTPATIENT
Start: 2021-12-07 | End: 2021-12-09

## 2021-12-07 NOTE — TELEPHONE ENCOUNTER
Rx Refill Note  Requested Prescriptions     Pending Prescriptions Disp Refills   • promethazine-dextromethorphan (PROMETHAZINE-DM) 6.25-15 MG/5ML syrup [Pharmacy Med Name: PROMETHAZINE-DM SYRUP] 180 mL 0     Sig: TAKE 5 ML BY MOUTH FOUR TIMES A DAY AS NEEDED FOR COUGH   • amLODIPine (NORVASC) 10 MG tablet [Pharmacy Med Name: AMLODIPINE BESYLATE 10 MG TAB] 30 tablet 0     Sig: TAKE ONE TABLET BY MOUTH EVERY DAY      Last office visit with prescribing clinician: 12/7/21      Next office visit with prescribing clinician: 12/21/21            Katherine Goins Rep  12/07/21, 16:30 CST

## 2021-12-07 NOTE — TELEPHONE ENCOUNTER
Rx Refill Note  Requested Prescriptions     Refused Prescriptions Disp Refills   • losartan (COZAAR) 50 MG tablet [Pharmacy Med Name: LOSARTAN POTASSIUM 50 MG TAB] 90 tablet 0     Sig: TAKE 1 TABLET DAILY.   • potassium chloride (K-DUR,KLOR-CON) 20 MEQ CR tablet [Pharmacy Med Name: POTASSIUM CL ER 20 MEQ TABLET] 30 tablet 0     Sig: TAKE ONE TABLET BY MOUTH EVERY DAY      Last office visit with prescribing clinician: 12/7/2021      Next office visit with prescribing clinician: 12/21/2021            Katherine Goins Rep  12/07/21, 12:15 CST     Refused - Dr Francois just sent in these two scripts today while pt was in the office.
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
## 2021-12-08 LAB
ANION GAP SERPL CALCULATED.3IONS-SCNC: 7.9 MMOL/L (ref 5–15)
BUN SERPL-MCNC: 11 MG/DL (ref 6–20)
BUN/CREAT SERPL: 11.1 (ref 7–25)
CALCIUM SPEC-SCNC: 9.9 MG/DL (ref 8.6–10.5)
CHLORIDE SERPL-SCNC: 102 MMOL/L (ref 98–107)
CO2 SERPL-SCNC: 29.1 MMOL/L (ref 22–29)
CREAT SERPL-MCNC: 0.99 MG/DL (ref 0.76–1.27)
GFR SERPL CREATININE-BSD FRML MDRD: 100 ML/MIN/1.73
GLUCOSE SERPL-MCNC: 75 MG/DL (ref 65–99)
POTASSIUM SERPL-SCNC: 4.7 MMOL/L (ref 3.5–5.2)
SODIUM SERPL-SCNC: 139 MMOL/L (ref 136–145)

## 2021-12-09 ENCOUNTER — OFFICE VISIT (OUTPATIENT)
Dept: CARDIOLOGY | Facility: CLINIC | Age: 41
End: 2021-12-09

## 2021-12-09 VITALS
OXYGEN SATURATION: 99 % | DIASTOLIC BLOOD PRESSURE: 118 MMHG | HEART RATE: 95 BPM | BODY MASS INDEX: 22.02 KG/M2 | WEIGHT: 153.8 LBS | TEMPERATURE: 97.1 F | HEIGHT: 70 IN | SYSTOLIC BLOOD PRESSURE: 168 MMHG

## 2021-12-09 DIAGNOSIS — R06.09 DYSPNEA ON EXERTION: ICD-10-CM

## 2021-12-09 DIAGNOSIS — I11.9 HYPERTENSIVE LEFT VENTRICULAR HYPERTROPHY, WITHOUT HEART FAILURE: ICD-10-CM

## 2021-12-09 DIAGNOSIS — I16.1 HYPERTENSIVE EMERGENCY: ICD-10-CM

## 2021-12-09 DIAGNOSIS — I10 ESSENTIAL HYPERTENSION: ICD-10-CM

## 2021-12-09 DIAGNOSIS — I10 HYPERTENSION, UNSPECIFIED TYPE: Primary | ICD-10-CM

## 2021-12-09 PROCEDURE — 99243 OFF/OP CNSLTJ NEW/EST LOW 30: CPT | Performed by: INTERNAL MEDICINE

## 2021-12-09 RX ORDER — LOSARTAN POTASSIUM 50 MG/1
100 TABLET ORAL DAILY
Qty: 90 TABLET | Refills: 1 | Status: SHIPPED | OUTPATIENT
Start: 2021-12-09

## 2021-12-09 RX ORDER — AMLODIPINE BESYLATE 10 MG/1
10 TABLET ORAL DAILY
Qty: 90 TABLET | Refills: 3 | Status: SHIPPED | OUTPATIENT
Start: 2021-12-09

## 2021-12-09 RX ORDER — LABETALOL 200 MG/1
200 TABLET, FILM COATED ORAL 2 TIMES DAILY
Qty: 120 TABLET | Refills: 3 | Status: SHIPPED | OUTPATIENT
Start: 2021-12-09

## 2021-12-09 NOTE — PROGRESS NOTES
Bogdan Thompson  41 y.o. male    12/09/2021  1. Hypertension, unspecified type    2. Hypertensive emergency    3. Essential hypertension    4. Hypertensive left ventricular hypertrophy, without heart failure    5. Dyspnea on exertion        History of Present Illness  Bogdan Thompson is a 41-year-old male who is being seen by me for the first time as referred by Dr. Francois.  The patient was a poor historian and his records were reviewed in detail.  Patient has had longstanding elevated blood pressure and it appears that compliance with medications has been an issue.  He was sent to the emergency room for hypertensive emergency on 11/29/2021 and EKG performed on that day showed sinus rhythm with sinus tachycardia with a heart rate of 108 bpm, significant left ventricular hypertrophy and repolarization changes.  Right atrial enlargement.  The patient apparently was only taking amlodipine 10 mg daily and after reassessment he was started on losartan 50 mg daily and chlorthalidone 25 mg daily.  He is on low-dose propranolol for anxiety.  He has been on HCTZ in the past.    Patient denied any chest pain headaches, nausea, vomiting tingling or numbness but did have occasional NYHA class II dyspnea on exertion.  He has no previous documented coronary artery disease or valvular heart disease but has had chronic pain syndrome with chronic right hip pain/status post right hip replacement.  He has been seen by pain management in the past.    His blood pressure was noted to be markedly elevated at 168/118 mmHg and heart rate was 95 bpm.  Patient denied any alcohol or drug.  He does however smoke half to 1 pack of cigarettes daily.     SUBJECTIVE    No Known Allergies      Past Medical History:   Diagnosis Date   • Anxiety    • Chronic low back pain    • Hypertension    • Insomnia          Past Surgical History:   Procedure Laterality Date   • HIP FRACTURE SURGERY Right 11/21/2017         Family History   Problem Relation Age of Onset   •  Hypertension Brother          Social History     Socioeconomic History   • Marital status: Single   Tobacco Use   • Smoking status: Current Every Day Smoker     Packs/day: 0.50     Years: 5.00     Pack years: 2.50     Types: Cigarettes     Last attempt to quit: 2021     Years since quittin.5   • Smokeless tobacco: Never Used   Vaping Use   • Vaping Use: Never used   Substance and Sexual Activity   • Alcohol use: No   • Drug use: No   • Sexual activity: Yes         Current Outpatient Medications   Medication Sig Dispense Refill   • albuterol sulfate  (90 Base) MCG/ACT inhaler Inhale 2 puffs Every 4 (Four) Hours As Needed for Wheezing. 18 g 2   • amLODIPine (NORVASC) 10 MG tablet Take 1 tablet by mouth Daily. Take in PM 90 tablet 3   • ARIPiprazole (ABILIFY) 5 MG tablet Take 1 tablet by mouth Daily. 30 tablet 3   • benztropine (COGENTIN) 1 MG tablet Take 1 tablet by mouth Daily. 30 tablet 2   • busPIRone (BUSPAR) 15 MG tablet Take 1 tablet by mouth Daily.     • chlorthalidone (HYGROTON) 25 MG tablet Take 1 tablet by mouth Every Morning. 90 tablet 1   • cyproheptadine (PERIACTIN) 4 MG tablet Take 1 tablet by mouth Daily.     • gabapentin (NEURONTIN) 300 MG capsule Take 1 capsule by mouth 3 (Three) Times a Day.     • haloperidol (HALDOL) 5 MG tablet Take 1 tablet by mouth Daily. 30 tablet 3   • losartan (Cozaar) 50 MG tablet Take 2 tablets by mouth Daily. Take in AM 90 tablet 1   • methocarbamol (Robaxin) 500 MG tablet Take 1 tablet by mouth 4 (Four) Times a Day. 56 tablet 1   • oxyCODONE-acetaminophen (Percocet) 7.5-325 MG per tablet Take 1 tablet by mouth Every 6 (Six) Hours As Needed (pain). 120 tablet 0   • potassium chloride (K-DUR,KLOR-CON) 20 MEQ CR tablet Take 1 tablet by mouth Daily. 30 tablet 4   • QUEtiapine (SEROquel) 200 MG tablet Take 1 tablet by mouth Every Night. 30 tablet 5   • sildenafil (VIAGRA) 100 MG tablet Take 1 tablet by mouth See Admin Instructions. 10 tablet 2   • venlafaxine  "(EFFEXOR) 75 MG tablet      • Vraylar 1.5 MG capsule capsule 1 capsule Daily.     • labetalol (NORMODYNE) 200 MG tablet Take 1 tablet by mouth 2 (Two) Times a Day. 120 tablet 3   • promethazine-dextromethorphan (PROMETHAZINE-DM) 6.25-15 MG/5ML syrup Take 5 mL by mouth 4 (Four) Times a Day As Needed for Cough. 180 mL 0     No current facility-administered medications for this visit.         OBJECTIVE    BP (!) 168/118 (BP Location: Left arm, Patient Position: Sitting, Cuff Size: Adult)   Pulse 95   Temp 97.1 °F (36.2 °C)   Ht 177.8 cm (70\")   Wt 69.8 kg (153 lb 12.8 oz)   SpO2 99%   BMI 22.07 kg/m²         Review of Systems     Constitutional:  Denies recent weight loss, weight gain, fever or chills     HENT:  Denies any hearing loss, epistaxis, hoarseness, or difficulty speaking.     Eyes: Wears eyeglasses or contact lenses     Respiratory: Occasional dyspnea with exertion, no cough, wheezing, or hemoptysis.     Cardiovascular: Negative for palpitations, chest pain, orthopnea, PND, peripheral edema, syncope, or claudication.     Gastrointestinal:  Denies change in bowel habits, dyspepsia, ulcer disease, hematochezia, or melena.     Endocrine: Negative for cold intolerance, heat intolerance, polydipsia, polyphagia and polyuria.     Genitourinary: Negative.      Musculoskeletal: DJD, chronic pain syndrome.  See HPI    Neurological:  Denies any history of recurrent headaches, strokes, TIA, or seizure disorder.     Hematological: Denies any food allergies, seasonal allergies, bleeding disorders, or lymphadenopathy.     Psychiatric/Behavioral: History of anxiety      Physical Exam     Constitutional: Cooperative, alert and oriented, in no acute distress.     HENT:   Head: Normocephalic, normal hair patterns, no masses or tenderness.  Ears, Nose, and Throat: No gross abnormalities. No pallor or cyanosis. Dentition good.   Eyes: EOMS intact, PERRL, conjunctivae and lids unremarkable. Fundoscopic exam and visual " fields not performed.   Neck: No palpable masses or adenopathy, no thyromegaly, no JVD, carotid pulses are full and equal bilaterally and without bruit.     Cardiovascular: Regular rhythm, S1 and S2 normal, no S3 or S4.  No murmurs, gallops, or rubs detected.     Pulmonary/Chest: Chest: normal symmetry, normal respiratory excursion, no intercostal retraction, no use of accessory muscles.     Pulmonary: Normal breath sounds. No rales or rhonchi.    Abdominal: Abdomen soft, bowel sounds normoactive, no masses, no hepatosplenomegaly, nontender, no bruit.     Musculoskeletal: No deformities, clubbing, cyanosis, erythema, or edema observed.     Neurological: No gross motor or sensory deficits noted, affect appropriate, oriented to time, person, place.     Skin: Warm and dry to the touch, no apparent skin lesion or mass noted.     Psychiatric: He has a normal mood and affect. His behavior is normal. Judgment and thought content normal.         Procedures      Lab Results   Component Value Date    WBC 5.80 08/13/2020    HGB 15.1 08/13/2020    HCT 42.1 08/13/2020    MCV 94.6 08/13/2020     08/13/2020     Lab Results   Component Value Date    GLUCOSE 75 12/07/2021    BUN 11 12/07/2021    CREATININE 0.99 12/07/2021    EGFRIFNONA 86 07/17/2017    EGFRIFAFRI 100 12/07/2021    BCR 11.1 12/07/2021    CO2 29.1 (H) 12/07/2021    CALCIUM 9.9 12/07/2021    ALBUMIN 4.30 08/13/2020    AST 18 08/13/2020    ALT 14 08/13/2020     Lab Results   Component Value Date    CHOL 222 (H) 08/13/2020    CHOL 223 (H) 07/17/2017     Lab Results   Component Value Date    TRIG 42 08/13/2020    TRIG 97 07/17/2017     Lab Results   Component Value Date    HDL 74 (H) 08/13/2020    HDL 66 07/17/2017     No components found for: LDLCALC  Lab Results   Component Value Date     (H) 08/13/2020     12/27/2018     07/17/2017     No results found for: HDLLDLRATIO  No components found for: CHOLHDL  Lab Results   Component Value Date     HGBA1C 5.46 07/17/2017     Lab Results   Component Value Date    TSH 1.030 07/17/2017           ASSESSMENT AND PLAN  Kaylie Thompson is a 41-year-old -American male with longstanding uncontrolled blood pressure in the background of questionable compliance with medication.  His EKG does show changes secondary to hypertension with left ventricular hypertrophy.  I had a long discussion with him about optimization of his blood pressure and he is unsure about the exact list of medications.  We did speak with his pharmacy and confirmed which ones he had picked up.  Records from Dr. Francois's office were also reviewed.    I have advised him to increase the dose of losartan to 100 mg in a.m. and chlorthalidone 25 mg in a.m. has been continued.  I have started him on labetalol 200 mg twice a day and propranolol has been discontinued.  Amlodipine 10 mg in p.m. has been advised.  Aggressive risk factor modification and smoking cessation stressed.  Compliance with medication stressed.  His LDL was elevated at 140 last year and we did have discussion about appropriate diet and optimization of LDL.  An echocardiogram to assess left ventricular function, chamber size and valvular function has been arranged.    He will be reassessed in a month.  Thank you for asked me to see this patient.    Diagnoses and all orders for this visit:    1. Hypertension, unspecified type (Primary)  -     ECG 12 Lead    2. Hypertensive emergency  -     losartan (Cozaar) 50 MG tablet; Take 2 tablets by mouth Daily. Take in AM  Dispense: 90 tablet; Refill: 1  -     Adult Transthoracic Echo Complete w/ Color, Spectral and Contrast if Necessary Per Protocol; Future    3. Essential hypertension  -     amLODIPine (NORVASC) 10 MG tablet; Take 1 tablet by mouth Daily. Take in PM  Dispense: 90 tablet; Refill: 3    4. Hypertensive left ventricular hypertrophy, without heart failure  -     Adult Transthoracic Echo Complete w/ Color, Spectral and Contrast if  Necessary Per Protocol; Future    5. Dyspnea on exertion  -     Adult Transthoracic Echo Complete w/ Color, Spectral and Contrast if Necessary Per Protocol; Future    Other orders  -     labetalol (NORMODYNE) 200 MG tablet; Take 1 tablet by mouth 2 (Two) Times a Day.  Dispense: 120 tablet; Refill: 3        Patient's Body mass index is 22.07 kg/m². indicating that he is within normal range (BMI 18.5-24.9). No BMI management plan needed..      Bogdan Thompson  reports that he has been smoking cigarettes. He has a 2.50 pack-year smoking history. He has never used smokeless tobacco.. I have educated him on the risk of diseases from using tobacco products such as cancer, COPD and heart disease.       I spent 3  minutes counseling the patient.               Jocy Campoverde MD  12/9/2021  13:35 CST

## 2021-12-10 ENCOUNTER — OFFICE VISIT (OUTPATIENT)
Dept: FAMILY MEDICINE CLINIC | Facility: CLINIC | Age: 41
End: 2021-12-10

## 2021-12-10 ENCOUNTER — APPOINTMENT (OUTPATIENT)
Dept: PHYSICAL THERAPY | Facility: HOSPITAL | Age: 41
End: 2021-12-10

## 2021-12-10 VITALS
RESPIRATION RATE: 20 BRPM | HEART RATE: 80 BPM | TEMPERATURE: 97.8 F | WEIGHT: 154 LBS | BODY MASS INDEX: 22.05 KG/M2 | SYSTOLIC BLOOD PRESSURE: 140 MMHG | OXYGEN SATURATION: 99 % | HEIGHT: 70 IN | DIASTOLIC BLOOD PRESSURE: 106 MMHG

## 2021-12-10 DIAGNOSIS — I10 PRIMARY HYPERTENSION: Chronic | ICD-10-CM

## 2021-12-10 DIAGNOSIS — R06.02 SHORTNESS OF BREATH: ICD-10-CM

## 2021-12-10 DIAGNOSIS — G89.29 OTHER CHRONIC PAIN: Chronic | ICD-10-CM

## 2021-12-10 DIAGNOSIS — R05.9 COUGH: Primary | ICD-10-CM

## 2021-12-10 PROCEDURE — 99214 OFFICE O/P EST MOD 30 MIN: CPT | Performed by: NURSE PRACTITIONER

## 2021-12-10 RX ORDER — GUAIFENESIN AND CODEINE PHOSPHATE 100; 10 MG/5ML; MG/5ML
5 SOLUTION ORAL 3 TIMES DAILY PRN
Qty: 180 ML | Refills: 0 | Status: SHIPPED | OUTPATIENT
Start: 2021-12-10
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
## 2021-12-15 ENCOUNTER — TELEPHONE (OUTPATIENT)
Dept: FAMILY MEDICINE CLINIC | Facility: CLINIC | Age: 41
End: 2021-12-15

## 2021-12-15 NOTE — TELEPHONE ENCOUNTER
----- Message from Jet Francois MD sent at 12/12/2021 10:45 PM CST -----  X-ray significant for minimal degenerative changes of right hip, evidence of prior hardware due to previous pelvic fracture.

## 2021-12-16 ENCOUNTER — TELEPHONE (OUTPATIENT)
Dept: FAMILY MEDICINE CLINIC | Facility: CLINIC | Age: 41
End: 2021-12-16

## 2021-12-16 NOTE — TELEPHONE ENCOUNTER
Please call the patient as soon as you can.. he is at the appt that was [place] for him in [place].. states that they need a copy of the MRI and to fax it to this number [phone]